# Patient Record
Sex: FEMALE | Race: WHITE | NOT HISPANIC OR LATINO | Employment: FULL TIME | URBAN - NONMETROPOLITAN AREA
[De-identification: names, ages, dates, MRNs, and addresses within clinical notes are randomized per-mention and may not be internally consistent; named-entity substitution may affect disease eponyms.]

---

## 2017-03-19 ENCOUNTER — COMMUNICATION - GICH (OUTPATIENT)
Dept: FAMILY MEDICINE | Facility: OTHER | Age: 27
End: 2017-03-19

## 2017-03-19 DIAGNOSIS — F41.9 ANXIETY DISORDER: ICD-10-CM

## 2017-03-19 DIAGNOSIS — F32.9 MAJOR DEPRESSIVE DISORDER, SINGLE EPISODE: ICD-10-CM

## 2017-06-23 ENCOUNTER — COMMUNICATION - GICH (OUTPATIENT)
Dept: FAMILY MEDICINE | Facility: OTHER | Age: 27
End: 2017-06-23

## 2017-06-23 DIAGNOSIS — F32.9 MAJOR DEPRESSIVE DISORDER, SINGLE EPISODE: ICD-10-CM

## 2017-06-23 DIAGNOSIS — F41.9 ANXIETY DISORDER: ICD-10-CM

## 2017-09-27 ENCOUNTER — OFFICE VISIT - GICH (OUTPATIENT)
Dept: FAMILY MEDICINE | Facility: OTHER | Age: 27
End: 2017-09-27

## 2017-09-27 ENCOUNTER — HISTORY (OUTPATIENT)
Dept: FAMILY MEDICINE | Facility: OTHER | Age: 27
End: 2017-09-27

## 2017-09-27 DIAGNOSIS — F51.02 ADJUSTMENT INSOMNIA: ICD-10-CM

## 2017-09-27 DIAGNOSIS — F32.9 MAJOR DEPRESSIVE DISORDER, SINGLE EPISODE: ICD-10-CM

## 2017-09-27 DIAGNOSIS — F41.9 ANXIETY DISORDER: ICD-10-CM

## 2017-09-27 ASSESSMENT — ANXIETY QUESTIONNAIRES
6. BECOMING EASILY ANNOYED OR IRRITABLE: NEARLY EVERY DAY
3. WORRYING TOO MUCH ABOUT DIFFERENT THINGS: MORE THAN HALF THE DAYS
2. NOT BEING ABLE TO STOP OR CONTROL WORRYING: NEARLY EVERY DAY
GAD7 TOTAL SCORE: 15
5. BEING SO RESTLESS THAT IT IS HARD TO SIT STILL: SEVERAL DAYS
1. FEELING NERVOUS, ANXIOUS, OR ON EDGE: NEARLY EVERY DAY
4. TROUBLE RELAXING: MORE THAN HALF THE DAYS
7. FEELING AFRAID AS IF SOMETHING AWFUL MIGHT HAPPEN: SEVERAL DAYS

## 2017-09-27 ASSESSMENT — PATIENT HEALTH QUESTIONNAIRE - PHQ9: SUM OF ALL RESPONSES TO PHQ QUESTIONS 1-9: 11

## 2017-11-24 ENCOUNTER — COMMUNICATION - GICH (OUTPATIENT)
Dept: FAMILY MEDICINE | Facility: OTHER | Age: 27
End: 2017-11-24

## 2017-11-24 DIAGNOSIS — F41.8 OTHER SPECIFIED ANXIETY DISORDERS: ICD-10-CM

## 2017-12-12 ENCOUNTER — HISTORY (OUTPATIENT)
Dept: FAMILY MEDICINE | Facility: OTHER | Age: 27
End: 2017-12-12

## 2017-12-12 ENCOUNTER — OFFICE VISIT - GICH (OUTPATIENT)
Dept: FAMILY MEDICINE | Facility: OTHER | Age: 27
End: 2017-12-12

## 2017-12-12 DIAGNOSIS — F41.8 OTHER SPECIFIED ANXIETY DISORDERS: ICD-10-CM

## 2017-12-12 DIAGNOSIS — F51.01 PRIMARY INSOMNIA: ICD-10-CM

## 2017-12-12 DIAGNOSIS — Z56.6 OTHER PHYSICAL AND MENTAL STRAIN RELATED TO WORK: ICD-10-CM

## 2017-12-12 SDOH — HEALTH STABILITY - MENTAL HEALTH: OTHER PHYSICAL AND MENTAL STRAIN RELATED TO WORK: Z56.6

## 2017-12-12 ASSESSMENT — ANXIETY QUESTIONNAIRES
6. BECOMING EASILY ANNOYED OR IRRITABLE: MORE THAN HALF THE DAYS
5. BEING SO RESTLESS THAT IT IS HARD TO SIT STILL: NEARLY EVERY DAY
1. FEELING NERVOUS, ANXIOUS, OR ON EDGE: NEARLY EVERY DAY
2. NOT BEING ABLE TO STOP OR CONTROL WORRYING: MORE THAN HALF THE DAYS
GAD7 TOTAL SCORE: 17
4. TROUBLE RELAXING: MORE THAN HALF THE DAYS
7. FEELING AFRAID AS IF SOMETHING AWFUL MIGHT HAPPEN: MORE THAN HALF THE DAYS
3. WORRYING TOO MUCH ABOUT DIFFERENT THINGS: NEARLY EVERY DAY

## 2017-12-27 NOTE — PROGRESS NOTES
Patient Information     Patient Name MRN Tray Lozoya [id number] Female 1990      Progress Notes by Misbah Hunt MD at 2017  1:30 PM     Author:  Misbah Hunt MD Service:  (none) Author Type:  Physician     Filed:  2017  1:56 PM Encounter Date:  2017 Status:  Signed     :  Misbah Hunt MD (Physician)            SUBJECTIVE:  27 y.o. female who presents for follow-up of her anxiety. She states that she's had a lot of trouble over the last several months with increasing anxiety and difficulty sleeping. She denies being depressed. 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 She works to 9:58 PM shift. Many of the residents have sundowning problems. She's noted that she gets home, she is very stressed out, and has a hard time falling asleep. She doesn't believe that her medications that she is taking now which include Celexa and BuSpar, are helping. She's been on them for quite some time. She wanted to come in sooner but she lacked insurance coverage, which she has now.    She feels well otherwise. She's having no physical problems at all. She again she denies being depressed. She denies any other problems with lifestyle.    Additional Review of Systems: see HPI:   No new symptoms otherwise    Past Medical History:     Diagnosis  Date     History of depression      History of recurrent UTIs         Current Outpatient Prescriptions       Medication  Sig Dispense Refill     ALPRAZolam (XANAX) 0.5 mg tablet Take 1 tablet by mouth 3 times daily if needed for Anxiety. 90 tablet 1     citalopram (CELEXA) 40 mg tablet TAKE ONE TABLET BY MOUTH ONE TIME DAILY 30 tablet 5     fluticasone (50 mcg per actuation) nasal solution (FLONASE) USE ONE SPRAY IN EACH NOSTRIL TWICE DAILY  16 g 11     omeprazole (PRILOSEC) 40 mg Delayed-Release  "capsule Take 1 capsule by mouth once daily. 90 capsule 3     No current facility-administered medications for this visit.      Medications have been reviewed by me and are current to the best of my knowledge and ability.      Allergies as of 09/27/2017 - Liban as Reviewed 09/27/2017      Allergen  Reaction Noted     Amoxicillin Rash 09/24/2012        OBJECTIVE:  /68  Pulse 88  Ht 1.689 m (5' 6.5\")  Wt 58.7 kg (129 lb 6.4 oz)  BMI 20.57 kg/m2  EXAM:  General Appearance: Pleasant, alert, appropriate appearance for age. No acute distress  Chest/Respiratory Exam: Normal chest wall and respirations. Clear to auscultation.  Cardiovascular Exam: Regular rate and rhythm. S1, S2, no murmur, click, gallop, or rubs.  Psychiatric Exam: Alert and oriented, appropriate affect.  Does not appear depressed.    ASSESSMENT/PLAN:  Anxiety associated with poor sleep habits. A lot of this likely is due to her work environment. She does like her job but it is stressful. I suggested at this point she stay on the Celexa, but we'll discontinue the BuSpar and changed to alprazolam 0.5 mg 3 times a day, which she's taken in the past several years ago and noted it was beneficial. We discussed the fact that this should be when needed only, and hopefully short-term. She was given a month's supply with one refill, and we'll follow-up again in 4-6 weeks to reevaluate.  Misbah Hunt MD ....................  9/27/2017   1:55 PM            "

## 2017-12-28 NOTE — TELEPHONE ENCOUNTER
Patient Information     Patient Name MRN Tray Lozoya [id number] Female 1990      Telephone Encounter by Nancy Melendrez at 2017 11:38 AM     Author:  Nancy Melendrez Service:  (none) Author Type:  NURS- Registered Nurse     Filed:  2017 11:45 AM Encounter Date:  2017 Status:  Signed     :  Nancy Melendrez (NURS- Registered Nurse)            Depression-in adults 18 and over  SSRI    Office visit in the past 12 months or as indicated in chart.  Should have clinic visit 1-2 months after initial prescription.    Last visit with RICHMOND ANDUJAR was on: 2016 [facility]  Next visit with RICHMOND ANDUJAR is on: No future appointment listed with this provider  Next visit with Family Practice is on: No future appointment listed in this department    Max refills 12 months from last office visit or per providers notes.      PHQ Depression Screening 2016   Date of PHQ exam (doc flow) 2016   1. Lack of interest/pleasure 1 - Several days   2. Feeling down/depressed 1 - Several days   PHQ-2 TOTAL SCORE 2   3. Trouble sleeping 3 - Nearly every day   4. Decreased energy 3 - Nearly every day   5. Appetite change 2 - More than half the days   6. Feelings of failure 1 - Several days   7. Trouble concentrating 1 - Several days   8. Activity level 0 - Not at all   9. Hurting yourself 0 - Not at all   PHQ-9 TOTAL SCORE 12   PHQ-9 Severity Level moderate   Functional Impairment somewhat difficult         [facility] [place]  Name of Medication: Buspar  Quantity requested: 90  Last fill date: 3/21/17  Due for refill: yes  Last visit with RICHMOND ANDUJAR was on: 2016 in [facility]  PCP:  Richmond Andujar MD  Controlled Substance Agreement:  n/a   Diagnosis r/t this medication request: anxiety and depression       Patient called and reminded that she was given a limited refill in march and letter was sent stating  that she is due for annual med review. Patient stated that she does not have insurance.  Please advise for medication refills.    Unable to complete prescription refill per RN Medication Refill Policy.................... Nancy Melendrez RN ....................  6/23/2017   11:39 AM

## 2017-12-28 NOTE — TELEPHONE ENCOUNTER
Patient Information     Patient Name MRN Tray Lozoya [id number] Female 1990      Telephone Encounter by Adali Stiles RN at 2017  3:52 PM     Author:  Adali Stiles RN Service:  (none) Author Type:  NURS- Registered Nurse     Filed:  2017  4:00 PM Encounter Date:  2017 Status:  Signed     :  Adali Stiles RN (NURS- Registered Nurse)            Quentin RasmusseneVICTOR MANUEL verified with pharmacy she filled on 2017 for 30 days and again on 10/25/2017 for another 30 day supply.  Last order was for #90 with one refill take 3 tabs daily.  Adali Stiles RN ....................  2017   3:58 PM

## 2017-12-28 NOTE — TELEPHONE ENCOUNTER
Patient Information     Patient Name MRN Tray Lozoya [id number] Female 1990      Telephone Encounter by Liliana Treadwell NP at 2017  3:34 PM     Author:  Liliana Treadwell NP Service:  (none) Author Type:  PHYS- Nurse Practitioner     Filed:  2017  3:35 PM Encounter Date:  2017 Status:  Signed     :  Liliana Treadwell NP (PHYS- Nurse Practitioner)            Hi Adali    This looks like it was filled in September #90 with 3 refills  Could you check please--thanks  Liliana Treadwell NP ....................  2017   3:34 PM

## 2017-12-28 NOTE — TELEPHONE ENCOUNTER
Patient Information     Patient Name MRN Sex Tray Wilcox [id number] Female 1990      Telephone Encounter by Adali Stiles RN at 2017  3:03 PM     Author:  Adali Stiles RN  Service:  (none) Author Type:  NURS- Registered Nurse     Filed:  2017  3:31 PM  Encounter Date:  2017 Status:  Addendum     :  Adali Stiles RN (NURS- Registered Nurse)        Related Notes: Original Note by Adali Stiles RN (NURS- Registered Nurse) filed at 2017  3:18 PM            In clinical absence of patient's primary, Misbah Andujar MD, patient is requesting that this message be sent to the primary provider's Teamlet for consideration please.      Liliana  Pt is requesting a refill of Alprazolam, last filled 2017 #90 R1 taking three daily as needed..  Pt was last seen 2017 when medication was prescribed.  Pt was to follow up in 4-6 weeks.  Pt was called and transferred to scheduling, future appointment noted for 2017.      [facility]  Name of Medication: Xanax  Quantity requested: 90  Last fill date: 2017  Due for refill: yes  Last visit with MISBAH ANDUJAR was on: 2017 in [facility]  PCP:  Misbah Andujar MD  Controlled Substance Agreement:  None signed  Diagnosis r/t this medication request: Anxiety     Unable to complete prescription refill per RN Medication Refill Policy.................... Adali Stiles RN ....................  2017   3:25 PM

## 2017-12-28 NOTE — TELEPHONE ENCOUNTER
Patient Information     Patient Name MRN Tray Lozoya [id number] Female 1990      Telephone Encounter by Adali Stiles RN at 2017  4:45 PM     Author:  Adali Stiles RN Service:  (none) Author Type:  NURS- Registered Nurse     Filed:  2017  4:46 PM Encounter Date:  2017 Status:  Signed     :  Adali Stiles RN (NURS- Registered Nurse)            Prescription was faxed to [facility].  Adali Stiles RN ....................  2017   4:45 PM

## 2017-12-30 NOTE — NURSING NOTE
Patient Information     Patient Name MRN Sex Tray Wilcox [id number] Female 1990      Nursing Note by Kayla Potts at 2017  1:30 PM     Author:  Kayla Potts Service:  (none) Author Type:  (none)     Filed:  2017  1:37 PM Encounter Date:  2017 Status:  Signed     :  Kayla Potts            Pt presents for medication management.  Kayla Gonzalez LPN

## 2018-01-03 NOTE — TELEPHONE ENCOUNTER
Patient Information     Patient Name MRN Tray Lozoya [id number] Female 1990      Telephone Encounter by Shavonne Scruggs RN at 3/20/2017 11:11 AM     Author:  Shavonne Scruggs RN Service:  (none) Author Type:  (none)     Filed:  3/20/2017 11:15 AM Encounter Date:  3/19/2017 Status:  Signed     :  Shavonne Scruggs RN (NURS- Registered Nurse)            Depression-in adults 18 and over  SSRI    Office visit in the past 12 months or as indicated in chart.  Should have clinic visit 1-2 months after initial prescription.    Last visit with [facility]  Next visit with RICHMOND ANDUJAR is on: No future appointment listed with this provider  Next visit with Family Practice is on: No future appointment listed in this department    Max refills 12 months from last office visit or per providers notes.      PHQ Depression Screening 2016   Date of PHQ exam (doc flow) 2016   1. Lack of interest/pleasure 1 - Several days   2. Feeling down/depressed 1 - Several days   PHQ-2 TOTAL SCORE 2   3. Trouble sleeping 3 - Nearly every day   4. Decreased energy 3 - Nearly every day   5. Appetite change 2 - More than half the days   6. Feelings of failure 1 - Several days   7. Trouble concentrating 1 - Several days   8. Activity level 0 - Not at all   9. Hurting yourself 0 - Not at all   PHQ-9 TOTAL SCORE 12   PHQ-9 Severity Level moderate   Functional Impairment somewhat difficult       Patient is due for medication management appointment. Limited refill provided at this time and letter sent for reminder to patient. Prescription refilled per RN Medication Refill Policy.................... Shavonne Scruggs ....................  3/20/2017   11:12 AM

## 2018-01-03 NOTE — TELEPHONE ENCOUNTER
Patient Information     Patient Name MRN Sex Tray Wilcox [id number] Female 1990      Telephone Encounter by Shavonne Scruggs RN at 3/20/2017 11:12 AM     Author:  Shavonne Scruggs RN Service:  (none) Author Type:  (none)     Filed:  3/20/2017 11:15 AM Encounter Date:  3/19/2017 Status:  Signed     :  Shavonne Scruggs RN (NURS- Registered Nurse)              [facility] [place]  Name of Medication: Buspar  Quantity requested: #90  Last fill date: 2016  Due for refill: Yes  [facility]  PCP:  Richmond Andujar MD  Controlled Substance Agreement:  NONE   Diagnosis r/t this medication request: Anxiety    No flowsheet data found.      Patient is due for medication management appointment. Letter sent for reminder to patient.     Unable to complete prescription refill per RN Medication Refill Policy.................... Shavonne Scruggs ....................  3/20/2017   11:12 AM

## 2018-01-27 VITALS
HEIGHT: 67 IN | BODY MASS INDEX: 20.31 KG/M2 | HEART RATE: 88 BPM | DIASTOLIC BLOOD PRESSURE: 68 MMHG | SYSTOLIC BLOOD PRESSURE: 126 MMHG | WEIGHT: 129.4 LBS

## 2018-02-04 ASSESSMENT — PATIENT HEALTH QUESTIONNAIRE - PHQ9: SUM OF ALL RESPONSES TO PHQ QUESTIONS 1-9: 11

## 2018-02-04 ASSESSMENT — ANXIETY QUESTIONNAIRES: GAD7 TOTAL SCORE: 15

## 2018-02-09 VITALS
WEIGHT: 152.8 LBS | BODY MASS INDEX: 24.29 KG/M2 | DIASTOLIC BLOOD PRESSURE: 80 MMHG | HEART RATE: 72 BPM | SYSTOLIC BLOOD PRESSURE: 128 MMHG

## 2018-02-10 ASSESSMENT — ANXIETY QUESTIONNAIRES: GAD7 TOTAL SCORE: 17

## 2018-02-12 NOTE — NURSING NOTE
Patient Information     Patient Name MRN Tray Lozoya [id number] Female 1990      Nursing Note by Ani Schrader at 2017  2:15 PM     Author:  Ani Schrader  Service:  (none) Author Type:  (none)     Filed:  2017  2:25 PM  Encounter Date:  2017 Status:  Addendum     :  Ani Schrader        Related Notes: Original Note by Ani Schrader filed at 2017  2:20 PM            Patient presents to clinic for medication management. Patient would like to discuss different options other than Xanax.   Ani Wright ....................  2017   2:19 PM

## 2018-02-12 NOTE — PROGRESS NOTES
Patient Information     Patient Name MRN Tray Lozoya [id number] Female 1990      Progress Notes by Misbah Hunt MD at 2017  2:15 PM     Author:  Misbah Hunt MD Service:  (none) Author Type:  Physician     Filed:  2017  3:20 PM Encounter Date:  2017 Status:  Signed     :  Misbah Hunt MD (Physician)            SUBJECTIVE:  27 y.o. female who presents for medication evaluation. She has found that she didn't realize how much BuSpar was helping her until she went off of it. The increase Celexa has been fine. She's been taking alprazolam mainly at bedtime, but finds that she oftentimes has to repeat the dose. 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 She said that the stress and anxiety increases and she doesn't like to take alprazolam during the day because of the side effects. At this point she is interested in getting back on the BuSpar but requests having alprazolam to use just at night. She has tried trazodone in the past but felt too groggy the next morning.    Additional Review of Systems: see HPI:   No new symptoms otherwise    Past Medical History:     Diagnosis  Date     History of depression      History of recurrent UTIs         Current Outpatient Prescriptions       Medication  Sig Dispense Refill     ALPRAZolam (XANAX) 1 mg tablet Take 0.5-1 tablets by mouth at bedtime if needed. 30 tablet 5     busPIRone (BUSPAR) 10 mg tablet Take 1 tablet by mouth 3 times daily if needed for Other (Specify) (anxiety). 90 tablet 5     citalopram (CELEXA) 40 mg tablet Take 1 tablet by mouth once daily. 30 tablet 5     fluticasone (50 mcg per actuation) nasal solution (FLONASE) USE ONE SPRAY IN EACH NOSTRIL TWICE DAILY  16 g 11     omeprazole (PRILOSEC) 40 mg Delayed-Release capsule Take 1 capsule by mouth once daily. 90 capsule 3     No current facility-administered medications for  this visit.      Medications have been reviewed by me and are current to the best of my knowledge and ability.      Allergies as of 12/12/2017 - Liban as Reviewed 12/12/2017      Allergen  Reaction Noted     Amoxicillin Rash 09/24/2012        OBJECTIVE:  /80 (Cuff Site: Right Arm, Cuff Size: Adult Regular)  Pulse 72  Wt 69.3 kg (152 lb 12.8 oz)  LMP 11/28/2017  BMI 24.29 kg/m2  EXAM:  General Appearance: Pleasant, alert, appropriate appearance for age. No acute distress  Psychiatric Exam: Alert and oriented, appropriate affect.  Discussed her problems for a frankly. She is not suicidal or severely depressed. The main problem is the stress and anxiety.      ASSESSMENT/PLAN:  Stress and anxiety associated with sleep disturbance. I think a reasonable approach is to use the BuSpar 10 mg 3 times a day when necessary during the day. Continue Celexa at 40 mg. Use alprazolam only at bedtime, a half to 1 mg. she is intolerant of trazodone. She will try this over the next few months in follow-up in 6 months or sooner if not improving. In addition I recommended an exam and Pap test sometime within the next few months.  Misbah Hunt MD ....................  12/12/2017   3:16 PM

## 2018-02-14 ENCOUNTER — DOCUMENTATION ONLY (OUTPATIENT)
Dept: FAMILY MEDICINE | Facility: OTHER | Age: 28
End: 2018-02-14

## 2018-02-14 PROBLEM — F51.01 PRIMARY INSOMNIA: Status: ACTIVE | Noted: 2017-12-12

## 2018-02-14 PROBLEM — Z56.6 STRESS AT WORK: Status: ACTIVE | Noted: 2017-12-12

## 2018-02-14 RX ORDER — CITALOPRAM HYDROBROMIDE 40 MG/1
40 TABLET ORAL DAILY
COMMUNITY
Start: 2017-12-12 | End: 2018-07-17

## 2018-02-14 RX ORDER — OMEPRAZOLE 40 MG/1
40 CAPSULE, DELAYED RELEASE ORAL DAILY
COMMUNITY
Start: 2016-02-25 | End: 2018-07-09

## 2018-02-14 RX ORDER — FLUTICASONE PROPIONATE 50 MCG
1 SPRAY, SUSPENSION (ML) NASAL 2 TIMES DAILY
COMMUNITY
Start: 2016-02-19 | End: 2020-05-12

## 2018-02-14 RX ORDER — ALPRAZOLAM 1 MG
.5-1 TABLET ORAL
COMMUNITY
Start: 2017-12-12 | End: 2018-05-23

## 2018-02-14 RX ORDER — BUSPIRONE HYDROCHLORIDE 10 MG/1
10 TABLET ORAL 3 TIMES DAILY PRN
COMMUNITY
Start: 2017-12-12 | End: 2018-05-23

## 2018-03-13 DIAGNOSIS — F41.9 ANXIETY AND DEPRESSION: Primary | ICD-10-CM

## 2018-03-13 DIAGNOSIS — F32.A ANXIETY AND DEPRESSION: Primary | ICD-10-CM

## 2018-03-13 RX ORDER — CITALOPRAM HYDROBROMIDE 40 MG/1
40 TABLET ORAL EVERY MORNING
Qty: 30 TABLET | Refills: 2 | OUTPATIENT
Start: 2018-03-13

## 2018-03-13 RX ORDER — ALPRAZOLAM 1 MG
1 TABLET ORAL
Qty: 30 TABLET | Refills: 2 | OUTPATIENT
Start: 2018-03-13

## 2018-03-13 RX ORDER — BUSPIRONE HYDROCHLORIDE 10 MG/1
10 TABLET ORAL 3 TIMES DAILY PRN
Qty: 90 TABLET | Refills: 2 | OUTPATIENT
Start: 2018-03-13

## 2018-03-13 NOTE — TELEPHONE ENCOUNTER
Medications filled 12/12/2017 for 6 months.    Unable to complete prescription refill per RN Medication Refill Policy.................... Adali Stiles ....................  3/13/2018   4:38 PM

## 2018-03-13 NOTE — TELEPHONE ENCOUNTER
Should have an appointment or PHQ9  questionaire done (last PHQ9 9/27/17) (11) prior to running out of refills.  Yvan 7 was (17) on 12/12/17.  Norma J. Gosselin LPN....................  3/13/2018   12:04 PM

## 2018-03-26 ENCOUNTER — HEALTH MAINTENANCE LETTER (OUTPATIENT)
Age: 28
End: 2018-03-26

## 2018-05-23 DIAGNOSIS — F43.22 ADJUSTMENT DISORDER WITH ANXIOUS MOOD: Primary | ICD-10-CM

## 2018-05-23 RX ORDER — BUSPIRONE HYDROCHLORIDE 10 MG/1
10 TABLET ORAL 3 TIMES DAILY PRN
Qty: 90 TABLET | Status: CANCELLED | OUTPATIENT
Start: 2018-05-23

## 2018-05-23 RX ORDER — BUSPIRONE HYDROCHLORIDE 10 MG/1
TABLET ORAL
Qty: 90 TABLET | Refills: 5 | OUTPATIENT
Start: 2018-05-23

## 2018-05-23 RX ORDER — BUSPIRONE HYDROCHLORIDE 10 MG/1
10 TABLET ORAL 3 TIMES DAILY PRN
Qty: 90 TABLET | Refills: 11 | Status: SHIPPED | OUTPATIENT
Start: 2018-05-23 | End: 2019-04-23

## 2018-05-23 RX ORDER — ALPRAZOLAM 0.5 MG
.5-1 TABLET ORAL
Qty: 60 TABLET | Refills: 5 | Status: SHIPPED | OUTPATIENT
Start: 2018-05-23 | End: 2018-07-09

## 2018-05-23 NOTE — TELEPHONE ENCOUNTER
[facility] states the patient got:  #90 on 12/12/17  #90 on 1/7/18  #90 on 2/3/18  #90 on 3/4/18  #90 on 3/31/18  #90 on 4/24/18    That is 6 refills: There are no refills left.  Please send new Rx for refill.  Norma J. Gosselin LPN....................  5/23/2018   3:05 PM

## 2018-05-23 NOTE — TELEPHONE ENCOUNTER
Redundant refill request refused: Too soon.    busPIRone (BUSPAR) 10 mg tablet 90 tablet 5 12/12/2017     Sig - Route: Take 1 tablet by mouth 3 times daily if needed for Other (Specify) (anxiety). - Oral    Class: eRx    E-Prescribing Status: Receipt confirmed by pharmacy (12/12/2017  3:11 PM CST)      [facility] [address] - [place], [place] - [address]     Unable to complete prescription refill per RN Medication Refill Policy. Ruth Clark RN .............. 5/23/2018  2:34 PM

## 2018-05-23 NOTE — TELEPHONE ENCOUNTER
Patient calling in regards to needing a refill of her buspirone and alprazolam. She states she isn't able to get an appointment any time soon, but will run out before. I stated I would ask Dr. Hunt and see what he says. She was also transferred to appointment line to schedule.     Caroline Garland LPN...................5/23/2018  3:33 PM

## 2018-07-09 ENCOUNTER — OFFICE VISIT (OUTPATIENT)
Dept: FAMILY MEDICINE | Facility: OTHER | Age: 28
End: 2018-07-09
Attending: FAMILY MEDICINE
Payer: COMMERCIAL

## 2018-07-09 VITALS
HEIGHT: 66 IN | DIASTOLIC BLOOD PRESSURE: 70 MMHG | WEIGHT: 165.2 LBS | SYSTOLIC BLOOD PRESSURE: 122 MMHG | BODY MASS INDEX: 26.55 KG/M2 | HEART RATE: 72 BPM

## 2018-07-09 DIAGNOSIS — F43.22 ADJUSTMENT DISORDER WITH ANXIOUS MOOD: ICD-10-CM

## 2018-07-09 DIAGNOSIS — F51.01 PRIMARY INSOMNIA: Primary | ICD-10-CM

## 2018-07-09 DIAGNOSIS — Z56.6 STRESS AT WORK: ICD-10-CM

## 2018-07-09 PROCEDURE — 99213 OFFICE O/P EST LOW 20 MIN: CPT | Performed by: FAMILY MEDICINE

## 2018-07-09 PROCEDURE — G0463 HOSPITAL OUTPT CLINIC VISIT: HCPCS

## 2018-07-09 RX ORDER — ALPRAZOLAM 1 MG
1 TABLET ORAL
Qty: 45 TABLET | Refills: 5 | Status: SHIPPED | OUTPATIENT
Start: 2018-07-09 | End: 2018-12-21

## 2018-07-09 SDOH — HEALTH STABILITY - MENTAL HEALTH: OTHER PHYSICAL AND MENTAL STRAIN RELATED TO WORK: Z56.6

## 2018-07-09 NOTE — MR AVS SNAPSHOT
"              After Visit Summary   7/9/2018    Tray Talavera    MRN: [id number]           Patient Information     Date Of Birth          1990        Visit Information        Provider Department      7/9/2018 1:30 PM Misbah Hunt MD [facility]        Today's Diagnoses     Primary insomnia    -  1    Adjustment disorder with anxious mood        Stress at work           Follow-ups after your visit        Who to contact     If you have questions or need follow up information about today's clinic visit or your schedule please contact [facility] directly at [phone].  Normal or non-critical lab and imaging results will be communicated to you by [employer]hart, letter or phone within 4 business days after the clinic has received the results. If you do not hear from us within 7 days, please contact the clinic through [employer] or phone. If you have a critical or abnormal lab result, we will notify you by phone as soon as possible.  Submit refill requests through [employer] or call your pharmacy and they will forward the refill request to us. Please allow 3 business days for your refill to be completed.          Additional Information About Your Visit        MyChart Information     [employer] gives you secure access to your electronic health record. If you see a primary care provider, you can also send messages to your care team and make appointments. If you have questions, please call your primary care clinic.  If you do not have a primary care provider, please call [phone] and they will assist you.        Care EveryWhere ID     This is your Care EveryWhere ID. This could be used by other organizations to access your [facility] medical records  [id number]        Your Vitals Were     Pulse Height Last Period Breastfeeding? BMI (Body Mass Index)       72 5' 5.5\" (1.664 m) 06/27/2018 No 27.07 kg/m2        Blood Pressure from Last 3 Encounters:   07/09/18 122/70   12/12/17 " 128/80   09/27/17 126/68    Weight from Last 3 Encounters:   07/09/18 165 lb 3.2 oz (74.9 kg)   12/12/17 152 lb 12.8 oz (69.3 kg)   09/27/17 129 lb 6.4 oz (58.7 kg)              Today, you had the following     No orders found for display         Today's Medication Changes          These changes are accurate as of 7/9/18  2:27 PM.  If you have any questions, ask your nurse or doctor.               These medicines have changed or have updated prescriptions.        Dose/Directions    ALPRAZolam 1 MG tablet   Commonly known as:  XANAX   This may have changed:    - medication strength  - how much to take  - additional instructions   Used for:  Adjustment disorder with anxious mood   Changed by:  Misbah Hunt MD        Dose:  1 mg   Take 1 tablet (1 mg) by mouth nightly as needed Repeat 0.5 mg at night if needed   Quantity:  45 tablet   Refills:  5            Where to get your medicines      Some of these will need a paper prescription and others can be bought over the counter.  Ask your nurse if you have questions.     Bring a paper prescription for each of these medications     ALPRAZolam 1 MG tablet                Primary Care Provider Office Phone # Fax #    Misbah Hunt [phone] [address] [facility] [address]        Equal Access to Services     ROSSANA OTERO AH: Hadii reilly swano [place], waaxda luqadaha, qaybta kaalmada adeegyada, caitlin nolasco. So [facility] [phone].    ATENCIÓN: Si habla español, tiene a caban disposición servicios gratuitos de asistencia lingüística. Llame al [phone].    We comply with applicable federal civil rights laws and [place] laws. We do not discriminate on the basis of race, color, national origin, age, disability, sex, sexual orientation, or gender identity.            Thank you!     Thank you for choosing [facility] AND [facility]  for your care. Our goal is always to provide you with  excellent care. Hearing back from our patients is one way we can continue to improve our services. Please take a few minutes to complete the written survey that you may receive in the mail after your visit with us. Thank you!             Your Updated Medication List - Protect others around you: Learn how to safely use, store and throw away your medicines at www.disposemymeds.org.          This list is accurate as of 7/9/18  2:27 PM.  Always use your most recent med list.                   Brand Name Dispense Instructions for use Diagnosis    ALPRAZolam 1 MG tablet    XANAX    45 tablet    Take 1 tablet (1 mg) by mouth nightly as needed Repeat 0.5 mg at night if needed    Adjustment disorder with anxious mood       busPIRone 10 MG tablet    BUSPAR    90 tablet    Take 1 tablet (10 mg) by mouth 3 times daily as needed    Adjustment disorder with anxious mood       citalopram 40 MG tablet    celeXA     Take 40 mg by mouth daily        fluticasone 50 MCG/ACT spray    FLONASE     Spray 1 spray in nostril 2 times daily

## 2018-07-09 NOTE — PROGRESS NOTES
"SUBJECTIVE:  28 year old female who presents for medication refill.  She has been feeling reasonably well, although she has been under more stress recently, working almost every day at a relatively stressful job.  She then had to euthanize her 12-year-old dog, due to cancer.  This is been very difficult that she had the dog since it was a puppy.  She said she is dealing with it okay but gets depressed at times.    Celexa and BuSpar have been stable and doing well for her.  She takes alprazolam at night to ease the stress and help to sleep but she is found is been more difficult.  She had this 0.5 mg tablets and was taking 2 of them.  She does not have any morning hangover but she usually wakes up after about 4 hours.    She is otherwise feeling fine.  No new problems or concerns.    Additional Review of Systems: See HPI: No new symptoms otherwise    Past Medical History:   Diagnosis Date     Personal history of other mental and behavioral disorders     No Comments Provided     Personal history of urinary infection     No Comments Provided        Current Outpatient Prescriptions   Medication Sig Dispense Refill     ALPRAZolam (XANAX) 1 MG tablet Take 1 tablet (1 mg) by mouth nightly as needed Repeat 0.5 mg at night if needed 45 tablet 5     busPIRone (BUSPAR) 10 MG tablet Take 1 tablet (10 mg) by mouth 3 times daily as needed 90 tablet 11     citalopram (CELEXA) 40 MG tablet Take 40 mg by mouth daily       fluticasone (FLONASE) 50 MCG/ACT spray Spray 1 spray in nostril 2 times daily         Allergies as of 07/09/2018 - Liban as Reviewed 07/09/2018   Allergen Reaction Noted     Trazodone  12/12/2017     Amoxicillin Rash 09/24/2012        OBJECTIVE:  /70  Pulse 72  Ht 5' 5.5\" (1.664 m)  Wt 165 lb 3.2 oz (74.9 kg)  LMP 06/27/2018  Breastfeeding? No  BMI 27.07 kg/m2  EXAM: {EXAM -  General: She is a pleasant, healthy-appearing young woman, who appears quite fatigued, but is in no acute distress  Neuro/psych: " She is alert and oriented, normal mood and affect, answers questions appropriately, mildly depressed, but not suicidal.    Labs/imaging: Thyroid functions and hemoglobin were normal 2 years ago.  Pap test was done in 2014    ASSESSMENT/PLAN:  Recurrent anxiety with associated depression and insomnia.  She states she is actually doing quite well with her current meds but would like to sleep better.  I suggested perhaps increasing the alprazolam 201 mg tablet, with 1/2 mg repeat if needed.  She is going to try this for a month or so, and let us know if things do not improve.    I did recommend that she schedule at some point for an exam and Pap test.  She stated she intends to do this sometime over the course of the next few months.  RICHMOND ANDUJAR MD on 7/9/2018 at 2:27 PM

## 2018-07-09 NOTE — NURSING NOTE
Patient presents to clinic for medication management.  Ani Wright ....................  7/9/2018   1:26 PM

## 2018-07-17 DIAGNOSIS — F32.A ANXIETY AND DEPRESSION: Primary | ICD-10-CM

## 2018-07-17 DIAGNOSIS — F41.9 ANXIETY AND DEPRESSION: Primary | ICD-10-CM

## 2018-07-19 RX ORDER — CITALOPRAM HYDROBROMIDE 40 MG/1
TABLET ORAL
Qty: 30 TABLET | Refills: 5 | Status: SHIPPED | OUTPATIENT
Start: 2018-07-19 | End: 2019-01-10

## 2018-07-19 NOTE — TELEPHONE ENCOUNTER
"Refill request from [facility] for:  citalopram (CELEXA) 40 MG tablet    LOV 7/9/2018 with Misbah Talaveraa and BuSpar have been stable and doing well for her    Refilled 12/12/2017 for 30 tablets and 5 refills     Medication refilled    Requested Prescriptions   Pending Prescriptions Disp Refills     citalopram (CELEXA) 40 MG tablet [Pharmacy Med Name: CITALOPRAM HBR 40 MG TABLET] 30 tablet 5     Sig: TAKE 1 TABLET BY MOUTH ONCE DAILY.    SSRIs Protocol Passed    7/19/2018  1:53 PM       Passed - Recent (12 mo) or future (30 days) visit within the authorizing provider's specialty    Patient had office visit in the last 12 months or has a visit in the next 30 days with authorizing provider or within the authorizing provider's specialty.  See \"Patient Info\" tab in inbasket, or \"Choose Columns\" in Meds & Orders section of the refill encounter.           Passed - Patient is age 18 or older       Passed - No active pregnancy on record       Passed - No positive pregnancy test in last 12 months        Lilly Crenshaw RN  ....................  7/19/2018   3:03 PM      "

## 2018-07-24 NOTE — PROGRESS NOTES
Patient Information     Patient Name  Tray Talavera MRN  [id number] Sex  Female   1990      Letter by Misbah Hunt MD at      Author:  Misbah Hunt MD Service:  (none) Author Type:  (none)    Filed:   Encounter Date:  3/19/2017 Status:  (Other)           Tray Talavera  [address] [place] [id number]          2017    Dear Ms. Talavera:    This letter is to remind you that you are due for your annual exam with Misbah Hunt MD . Your last comprehensive medication visit was more than 12 months ago.     A LIMITED refill of citalopram (CELEXA) 40 mg tablet has been called into your pharmacy.    Additional refills require an annual physical and medication refill appointment with Misbah Hunt MD. Please call the clinic at [phone] to schedule your appointment.    Thank you,    The Refill Nurse  [facility]

## 2018-08-22 ENCOUNTER — HOSPITAL ENCOUNTER (OUTPATIENT)
Dept: GENERAL RADIOLOGY | Facility: OTHER | Age: 28
Discharge: HOME | End: 2018-08-22
Attending: NURSE PRACTITIONER | Admitting: NURSE PRACTITIONER
Payer: COMMERCIAL

## 2018-08-22 ENCOUNTER — OFFICE VISIT (OUTPATIENT)
Dept: INTERNAL MEDICINE | Facility: OTHER | Age: 28
End: 2018-08-22
Attending: NURSE PRACTITIONER
Payer: COMMERCIAL

## 2018-08-22 VITALS
SYSTOLIC BLOOD PRESSURE: 132 MMHG | TEMPERATURE: 97.4 F | DIASTOLIC BLOOD PRESSURE: 84 MMHG | WEIGHT: 163.6 LBS | BODY MASS INDEX: 26.81 KG/M2

## 2018-08-22 DIAGNOSIS — J18.9 PNEUMONIA OF RIGHT LOWER LOBE DUE TO INFECTIOUS ORGANISM: ICD-10-CM

## 2018-08-22 DIAGNOSIS — R05.9 COUGH: ICD-10-CM

## 2018-08-22 DIAGNOSIS — R05.9 COUGH: Primary | ICD-10-CM

## 2018-08-22 PROCEDURE — 99214 OFFICE O/P EST MOD 30 MIN: CPT | Performed by: NURSE PRACTITIONER

## 2018-08-22 PROCEDURE — G0463 HOSPITAL OUTPT CLINIC VISIT: HCPCS | Mod: 25

## 2018-08-22 PROCEDURE — 71046 X-RAY EXAM CHEST 2 VIEWS: CPT

## 2018-08-22 RX ORDER — ALBUTEROL SULFATE 90 UG/1
2 AEROSOL, METERED RESPIRATORY (INHALATION) EVERY 6 HOURS PRN
Qty: 1 INHALER | Refills: 0 | Status: SHIPPED | OUTPATIENT
Start: 2018-08-22 | End: 2020-05-12

## 2018-08-22 RX ORDER — AZITHROMYCIN 250 MG/1
TABLET, FILM COATED ORAL
Qty: 6 TABLET | Refills: 0 | Status: SHIPPED | OUTPATIENT
Start: 2018-08-22 | End: 2019-01-11

## 2018-08-22 ASSESSMENT — ANXIETY QUESTIONNAIRES
GAD7 TOTAL SCORE: 0
7. FEELING AFRAID AS IF SOMETHING AWFUL MIGHT HAPPEN: NOT AT ALL
6. BECOMING EASILY ANNOYED OR IRRITABLE: NOT AT ALL
1. FEELING NERVOUS, ANXIOUS, OR ON EDGE: NOT AT ALL
3. WORRYING TOO MUCH ABOUT DIFFERENT THINGS: NOT AT ALL
5. BEING SO RESTLESS THAT IT IS HARD TO SIT STILL: NOT AT ALL
IF YOU CHECKED OFF ANY PROBLEMS ON THIS QUESTIONNAIRE, HOW DIFFICULT HAVE THESE PROBLEMS MADE IT FOR YOU TO DO YOUR WORK, TAKE CARE OF THINGS AT HOME, OR GET ALONG WITH OTHER PEOPLE: NOT DIFFICULT AT ALL
2. NOT BEING ABLE TO STOP OR CONTROL WORRYING: NOT AT ALL

## 2018-08-22 ASSESSMENT — PAIN SCALES - GENERAL: PAINLEVEL: NO PAIN (0)

## 2018-08-22 ASSESSMENT — PATIENT HEALTH QUESTIONNAIRE - PHQ9: 5. POOR APPETITE OR OVEREATING: NOT AT ALL

## 2018-08-22 NOTE — PROGRESS NOTES
Subjective:  She is here today for cough that is been occurring for greater than a week.  She has cough and chest congestion.  She states that she was starting to feel better after about the first week but then her cough became more productive and she had increased nasal drainage.  She has some pressure in her sinuses.  She has not had a fever in the past week.  Her cough is productive of yellow-green sputum.  No pain in her chest with coughing.  No hemoptysis.  No shortness of breath.  No history of pneumonia.  She has been taking DayQuil for her symptoms.  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  His mother was diagnosed yesterday with pneumonia.  Patient is a smoker.  No previous history of pneumonia.    Patient Active Problem List   Diagnosis     Primary insomnia     Stress at work     Adjustment disorder with anxious mood     Past Medical History:   Diagnosis Date     Personal history of other mental and behavioral disorders     No Comments Provided     Personal history of urinary infection     No Comments Provided     Past Surgical History:   Procedure Laterality Date     APPENDECTOMY OPEN      09/05,Open, GICH     ESOPHAGOSCOPY, GASTROSCOPY, DUODENOSCOPY (EGD), COMBINED      02/23/2012,by Dr. Jama. bile reflux and gastropathy     Social History     Social History     Marital status: Single     Spouse name: N/A     Number of children: N/A     Years of education: N/A     Occupational History     Not on file.     Social History Main Topics     Smoking status: Current Every Day Smoker     Packs/day: 0.75     Types: Cigarettes     Smokeless tobacco: Never Used     Alcohol use Yes      Comment: Alcoholic Drinks/day: Rarely     Drug use: No     Sexual activity: Not Currently     Partners: Male     Other Topics Concern     Not on file     Social History Narrative    single. Works as a  an [employer]    Patient currently smokes, 1 ppd.     Alcohol Use  - yes, 1-5 drinks per week    single. currently unemployed    p 11/12/2013.     Family History   Problem Relation Age of Onset     Diabetes Mother      Diabetes     Other - See Comments Mother      Psychiatric illness,depression     Current Outpatient Prescriptions   Medication Sig Dispense Refill     albuterol (PROAIR HFA/PROVENTIL HFA/VENTOLIN HFA) 108 (90 Base) MCG/ACT inhaler Inhale 2 puffs into the lungs every 6 hours as needed for shortness of breath / dyspnea or wheezing 1 Inhaler 0     ALPRAZolam (XANAX) 1 MG tablet Take 1 tablet (1 mg) by mouth nightly as needed Repeat 0.5 mg at night if needed 45 tablet 5     azithromycin (ZITHROMAX) 250 MG tablet Two tablets first day, then one tablet daily for four days. 6 tablet 0     busPIRone (BUSPAR) 10 MG tablet Take 1 tablet (10 mg) by mouth 3 times daily as needed 90 tablet 11     citalopram (CELEXA) 40 MG tablet TAKE 1 TABLET BY MOUTH ONCE DAILY. 30 tablet 5     fluticasone (FLONASE) 50 MCG/ACT spray Spray 1 spray in nostril 2 times daily       Trazodone and Amoxicillin      Review of Systems:  Review of Systems  See HPI    Objective:   /84 (BP Location: Right arm, Patient Position: Chair, Cuff Size: Adult Regular)  Temp 97.4  F (36.3  C) (Tympanic)  Wt 163 lb 9.6 oz (74.2 kg)  LMP 07/28/2018  Breastfeeding? No  BMI 26.81 kg/m2  Physical Exam  Pleasant female no acute distress.  Affect normal.  Alert and oriented ×4.  Sclera nonicteric.  Conjunctiva noninflamed.  Skin color pink.  Nasal mucosa with mild bilateral swelling and erythema.  Mucous membranes moist.  Throat without erythema.  Neck supple without adenopathy.  Lung fields with rhonchi throughout and faint rales at right base.  No wheezing.  Chest x-ray: Peribronchial cuffing noted and small infiltrate in the right lower lung, radiology interpretation pending.    Assessment:    ICD-10-CM    1. Cough R05 XR Chest 2 Views   2. Pneumonia of right lower lobe due to infectious organism (H) J18.1  azithromycin (ZITHROMAX) 250 MG tablet     albuterol (PROAIR HFA/PROVENTIL HFA/VENTOLIN HFA) 108 (90 Base) MCG/ACT inhaler       Plan:   Patient will be treated with Z-Abdiel and albuterol inhaler 2 puffs 4 times daily as needed for increased cough and wheezing.  If radiology agrees that she does have an infiltrate  then she will need to have a follow-up chest x-ray in 1 month to make sure this has resolved.  Tobacco cessation encouraged.  If she is not finding improvement with treatment or develops any worsening then recommend follow-up.  She did not want prescribed cough syrup.  She will use over-the-counter cough syrup as needed.  Discussed interaction between Celexa and Z-Abdiel eventually causing a prolonged QT interval.  Risk is minimal due to short-term use of the Z-Abdiel.  She cannot take off work today but she will wear a mask.  She is aware that she is contagious for the 48 hours after start of antibiotic.  Encouraged good handwashing and other hygiene measures for reducing spread of infection.    DEEPAK Jorge   8/22/2018  2:11 PM

## 2018-08-22 NOTE — MR AVS SNAPSHOT
After Visit Summary   8/22/2018    Tray Talavera    MRN: [id number]           Patient Information     Date Of Birth          1990        Visit Information        Provider Department      8/22/2018 1:40 PM Yara Chavez NP [facility]        Today's Diagnoses     Cough    -  1    Pneumonia of right lower lobe due to infectious organism (H)           Follow-ups after your visit        Future tests that were ordered for you today     Open Future Orders        Priority Expected Expires Ordered    XR Chest 2 Views Routine 8/22/2018 8/22/2019 8/22/2018            Who to contact     If you have questions or need follow up information about today's clinic visit or your schedule please contact [facility] directly at [phone].  Normal or non-critical lab and imaging results will be communicated to you by Scalityhart, letter or phone within 4 business days after the clinic has received the results. If you do not hear from us within 7 days, please contact the clinic through Scalityhart or phone. If you have a critical or abnormal lab result, we will notify you by phone as soon as possible.  Submit refill requests through Veotag or call your pharmacy and they will forward the refill request to us. Please allow 3 business days for your refill to be completed.          Additional Information About Your Visit        MyChart Information     Veotag gives you secure access to your electronic health record. If you see a primary care provider, you can also send messages to your care team and make appointments. If you have questions, please call your primary care clinic.  If you do not have a primary care provider, please call [phone] and they will assist you.        Care EveryWhere ID     This is your Care EveryWhere ID. This could be used by other organizations to access your [facility] medical records  [id number]        Your Vitals Were     Temperature Last  Period Breastfeeding? BMI (Body Mass Index)          97.4  F (36.3  C) (Tympanic) 07/28/2018 No 26.81 kg/m2         Blood Pressure from Last 3 Encounters:   08/22/18 132/84   07/09/18 122/70   12/12/17 128/80    Weight from Last 3 Encounters:   08/22/18 163 lb 9.6 oz (74.2 kg)   07/09/18 165 lb 3.2 oz (74.9 kg)   12/12/17 152 lb 12.8 oz (69.3 kg)                 Today's Medication Changes          These changes are accurate as of 8/22/18  2:30 PM.  If you have any questions, ask your nurse or doctor.               Start taking these medicines.        Dose/Directions    albuterol 108 (90 Base) MCG/ACT inhaler   Commonly known as:  PROAIR HFA/PROVENTIL HFA/VENTOLIN HFA   Used for:  Pneumonia of right lower lobe due to infectious organism (H)   Started by:  Yara Chavez NP        Dose:  2 puff   Inhale 2 puffs into the lungs every 6 hours as needed for shortness of breath / dyspnea or wheezing   Quantity:  1 Inhaler   Refills:  0       azithromycin 250 MG tablet   Commonly known as:  ZITHROMAX   Used for:  Pneumonia of right lower lobe due to infectious organism (H)   Started by:  Yara Chavez NP        Two tablets first day, then one tablet daily for four days.   Quantity:  6 tablet   Refills:  0            Where to get your medicines      These medications were sent to [facility], - [place], [place] - [address]  [address], [place] [address]     Phone:  [phone]     albuterol 108 (90 Base) MCG/ACT inhaler    azithromycin 250 MG tablet                Primary Care Provider Office Phone # Fax #    Misbah Hunt [phone]       [address] [facility] [address]        Equal Access to Services     AMBAR OTERO AH: Khadar Hayes, juanito luqmaribell, qaorionta kacaitlin martino. [facility] [phone].    ATENCIÓN: Si lexy boykin, tiene a caban disposición servicios  teo de asistencia lingüística. Meek yoo [phone].    We comply with applicable federal civil rights laws and [place] laws. We do not discriminate on the basis of race, color, national origin, age, disability, sex, sexual orientation, or gender identity.            Thank you!     Thank you for choosing [facility]  for your care. Our goal is always to provide you with excellent care. Hearing back from our patients is one way we can continue to improve our services. Please take a few minutes to complete the written survey that you may receive in the mail after your visit with us. Thank you!             Your Updated Medication List - Protect others around you: Learn how to safely use, store and throw away your medicines at www.disposemymeds.org.          This list is accurate as of 8/22/18  2:30 PM.  Always use your most recent med list.                   Brand Name Dispense Instructions for use Diagnosis    albuterol 108 (90 Base) MCG/ACT inhaler    PROAIR HFA/PROVENTIL HFA/VENTOLIN HFA    1 Inhaler    Inhale 2 puffs into the lungs every 6 hours as needed for shortness of breath / dyspnea or wheezing    Pneumonia of right lower lobe due to infectious organism (H)       ALPRAZolam 1 MG tablet    XANAX    45 tablet    Take 1 tablet (1 mg) by mouth nightly as needed Repeat 0.5 mg at night if needed    Adjustment disorder with anxious mood       azithromycin 250 MG tablet    ZITHROMAX    6 tablet    Two tablets first day, then one tablet daily for four days.    Pneumonia of right lower lobe due to infectious organism (H)       busPIRone 10 MG tablet    BUSPAR    90 tablet    Take 1 tablet (10 mg) by mouth 3 times daily as needed    Adjustment disorder with anxious mood       citalopram 40 MG tablet    celeXA    30 tablet    TAKE 1 TABLET BY MOUTH ONCE DAILY.    Anxiety and depression       fluticasone 50 MCG/ACT spray    FLONASE     Spray 1 spray in nostril 2 times daily

## 2018-08-24 ASSESSMENT — ANXIETY QUESTIONNAIRES: GAD7 TOTAL SCORE: 0

## 2018-08-24 ASSESSMENT — PATIENT HEALTH QUESTIONNAIRE - PHQ9: SUM OF ALL RESPONSES TO PHQ QUESTIONS 1-9: 0

## 2018-12-21 DIAGNOSIS — F43.22 ADJUSTMENT DISORDER WITH ANXIOUS MOOD: ICD-10-CM

## 2018-12-24 RX ORDER — ALPRAZOLAM 1 MG
TABLET ORAL
Qty: 45 TABLET | Refills: 5 | Status: SHIPPED | OUTPATIENT
Start: 2018-12-24 | End: 2020-05-12

## 2018-12-24 NOTE — TELEPHONE ENCOUNTER
Patient walked in today asking when her RX would be sent over to pharmacy.  Found request in refills, told her that I'd route to PCP now for attention.  Last office visit with PCP 7/9/18, last refill given 7/9/18 of 45 with 5 refills.  ALPRAZolam (XANAX) 1 MG tablet, Take 1 tablet (1 mg) by mouth nightly as needed Repeat 0.5 mg at night if needed.  Unable to complete prescription refill per RN Medication Refill Policy. Rita Hurst 12/24/2018 10:37 AM

## 2018-12-24 NOTE — TELEPHONE ENCOUNTER
Called patient and verified last name and . Informed her of the medication being approved and no further questions.    Rula Villagomez LPN on 2018 at 11:20 AM

## 2019-01-10 DIAGNOSIS — F32.A ANXIETY AND DEPRESSION: ICD-10-CM

## 2019-01-10 DIAGNOSIS — F41.9 ANXIETY AND DEPRESSION: ICD-10-CM

## 2019-01-11 RX ORDER — CITALOPRAM HYDROBROMIDE 40 MG/1
TABLET ORAL
Qty: 30 TABLET | Refills: 2 | Status: SHIPPED | OUTPATIENT
Start: 2019-01-11 | End: 2019-04-12

## 2019-01-11 NOTE — TELEPHONE ENCOUNTER
"Refill request from [facility] for:  citalopram (CELEXA) 40 MG tablet    LOV 8/22/2018 with Maria E Chavez NP    Last filled 7/19/2018 for 30 X 5 refills    LOV with Misbah Hunt MD 7/9/2018  Celexa and BuSpar have been stable and doing well for her.    Advised pt to schedule an exam and Pap    No upcoming appt noted at this time    Will refill, send letter, and add appt reminder note to pharm in Rx    Requested Prescriptions   Pending Prescriptions Disp Refills     citalopram (CELEXA) 40 MG tablet [Pharmacy Med Name: CITALOPRAM HBR 40 MG TABLET] 30 tablet 5     Sig: TAKE 1 TABLET BY MOUTH ONCE DAILY.    SSRIs Protocol Passed - 1/10/2019  1:48 AM       Passed - Recent (12 mo) or future (30 days) visit within the authorizing provider's specialty    Patient had office visit in the last 12 months or has a visit in the next 30 days with authorizing provider or within the authorizing provider's specialty.  See \"Patient Info\" tab in inbasket, or \"Choose Columns\" in Meds & Orders section of the refill encounter.           Passed - Medication is active on med list       Passed - Patient is age 18 or older       Passed - No active pregnancy on record       Passed - No positive pregnancy test in last 12 months        Lilly Crenshaw RN  ....................  1/11/2019   9:54 AM      "

## 2019-04-10 DIAGNOSIS — F43.22 ADJUSTMENT DISORDER WITH ANXIOUS MOOD: ICD-10-CM

## 2019-04-12 DIAGNOSIS — F41.9 ANXIETY AND DEPRESSION: ICD-10-CM

## 2019-04-12 DIAGNOSIS — F32.A ANXIETY AND DEPRESSION: ICD-10-CM

## 2019-04-12 RX ORDER — BUSPIRONE HYDROCHLORIDE 10 MG/1
10 TABLET ORAL 3 TIMES DAILY PRN
Qty: 90 TABLET | Refills: 10 | OUTPATIENT
Start: 2019-04-12

## 2019-04-12 NOTE — TELEPHONE ENCOUNTER
Filled 5/23/18 #90 x 11. Due 5/23/19. Pharmacy alerted. Unable to complete prescription refill per RNMedication Refill Policy.................... Gretchen Encarnacion ....................  4/12/2019   3:59 PM    busPIRone (BUSPAR) 10 MG tablet 90 tablet 11 5/23/2018  No   Sig - Route: Take 1 tablet (10 mg) by mouth 3 times daily as needed - Oral   Sent to pharmacy as: busPIRone (BUSPAR) 10 MG tablet   Class: E-Prescribe   Order: [id number]   E-Prescribing Status: Receipt confirmed by pharmacy (5/23/2018  3:41 PM CDT)   Printout Tracking     External Result Report   Pharmacy     [facility] [id number] [place] [facility] - [place], [place] - [facility] [address]

## 2019-04-16 RX ORDER — CITALOPRAM HYDROBROMIDE 40 MG/1
TABLET ORAL
Qty: 90 TABLET | Refills: 1 | Status: SHIPPED | OUTPATIENT
Start: 2019-04-16 | End: 2020-05-12

## 2019-04-16 NOTE — TELEPHONE ENCOUNTER
[facility] sent Rx request for the following:      CITALOPRAM HBR 40 MG TABLET  Sig: TAKE 1 TABLET BY MOUTH EVERY DAY  Last Prescription Date:   1/11/19  Last Fill Qty/Refills:         30, R-2    Last Office Visit:              8/22/18 ([facility])          7/9/18 (Former [facility] Pt)  Future Office visit:           None.    Per chart review, Pt was sent reminder letter on 1/11/19 along with #30, R-2 manoj refill and note to pharmacy. Patient failed to schedule appointment.    Routing refill request to provider for review/approval because:  SSRIs Protocol Failed4/16 9:30 AM   Recent (12 mo) or future (30 days) visit within the authorizing provider's specialty     Attempted reaching Pt, to assist her in scheduling appointment. Left message on machine to call back. Ruth Clark RN .............. 4/16/2019  9:35 AM.

## 2019-04-16 NOTE — TELEPHONE ENCOUNTER
Noted approval of request:    citalopram (CELEXA) 40 MG tablet 90 tablet 1 4/16/2019  No   Sig: TAKE 1 TABLET BY MOUTH EVERY DAY   [facility] [address] [facility] - [place], [place] - [address]    Patient notified of this information. Ruth Clark RN .............. 4/16/2019  1:35 PM

## 2019-04-16 NOTE — TELEPHONE ENCOUNTER
Patient returned call; her last name and  were verified. Pt aware she needs to choose a new provider. Pt reports that she received a new insurance card in December, but didn't have insurance coverage, when she went to use it in January. Pt has made several attempts to reach [employer] regarding this, but has not been able to wait on hold long enough to speak with a representative. Pt verbalized plan to attempt reaching them again. Meanwhile, Pt states she is ALMOST OUT OF MEDICATION.    In clinical absence of Dr. Hunt, patient is requesting that this message be sent to the primary provider's Teamlet for consideration please.      Ruth Clark RN .............. 2019  10:23 AM

## 2019-04-23 DIAGNOSIS — F43.22 ADJUSTMENT DISORDER WITH ANXIOUS MOOD: Primary | ICD-10-CM

## 2019-04-23 RX ORDER — BUSPIRONE HYDROCHLORIDE 10 MG/1
10 TABLET ORAL 3 TIMES DAILY PRN
Qty: 90 TABLET | Refills: 0 | Status: SHIPPED | OUTPATIENT
Start: 2019-04-23 | End: 2019-05-25

## 2019-04-23 NOTE — TELEPHONE ENCOUNTER
[facility] pharmacy in [place] was requesting the following medication:     busPIRone (BUSPAR) 10 MG tablet  Sig: Take 1 tablet (10 mg) by mouth 3 times daily as needed  Last Written Prescription Date:  5/23/18  Last Fill Quantity: 90,  # refills: 11   Last office visit: 7/9/2018 with prescribing provider:  Dr. Hunt   Future Office Visit:  none     Atypical Antidepressants Protocol Failed4/23 4:13 PM   Recent (12 mo) or future (30 days) visit within the authorizing provider's specialty        Per previous note, patient was attempting to obtain health insurance. Noted Liliana Treadwell approved last requested medication as a teamlet. Will route to Liliana for refill consideration.     Naheed Gee RN on 4/23/2019 at 4:23 PM

## 2019-05-25 DIAGNOSIS — F43.22 ADJUSTMENT DISORDER WITH ANXIOUS MOOD: ICD-10-CM

## 2019-05-28 NOTE — TELEPHONE ENCOUNTER
[facility] for the following:      BUSPIRONE HCL 10 MG TABLET  Sig: Take 1 tablet (10 mg) by mouth 3 times daily as needed  Last Prescription Date:   4/23/19  Last Fill Qty/Refills:         90, R-0    Last Office Visit:              7/9/18 (Former [facility] Pt)  Future Office visit:           None.    Per refill encounter, dated 4/12/19, Pt was attempting to obtain health insurance. Liliana Treadwell approved 30-day limited supply, as teamlet, on 4/23.    Attempted reaching Pt to determine status of health insurance. Left message on machine to call back. Ruth Clark RN .............. 5/28/2019  4:52 PM

## 2019-05-29 RX ORDER — BUSPIRONE HYDROCHLORIDE 10 MG/1
10 TABLET ORAL 3 TIMES DAILY PRN
Qty: 90 TABLET | Refills: 1 | Status: SHIPPED | OUTPATIENT
Start: 2019-05-29 | End: 2020-05-07

## 2019-05-29 NOTE — TELEPHONE ENCOUNTER
Patient notified of Liliana Treadwell's response. She talked with the  this morning and feels like she got some help and resolution on things. No further concerns at this time.

## 2019-05-29 NOTE — TELEPHONE ENCOUNTER
I will refill as requested    We will route to nursing [place] to let patient know this was sent to the pharmacy so she can pick it up    Would recommend she call our  if she has any questions regarding her bill her statement and if she is eligible for any insurance plans    She could try project care on Tuesdays from 5 30-7 30  Would recommend she call the number and talk with Modesta the  about eligibility and getting her medications and labs done through the free clinic    YAYA Villegas CNP   May 29, 2019

## 2019-05-29 NOTE — TELEPHONE ENCOUNTER
"Called and spoke to Patient after verifying last name and date of birth. Pt states, \"I work every day until 5:30 and honestly don't have time during the day, to call [facility] and figure all of this out, and honestly, I need help with it.\" Pt explained that she received new insurance card in December and her January bill came with \"???\" in the payment due section. She decided to wait for 2nd statement. She came to clinic in January, then received letter from clinic, notifying her that she did not have coverage. Pt has made several failed attempts to get though to [employer]. She has had link sent multiple times to her email, but never receives it. A friend suggested contacting [employer], for more help. Writer provided hours and phone number for Jan, and Pt verbalized plan to call them for help. Pt also noted she has approximately 25 tablets remaining, as she takes \"up to 3 per day.\"    Will route information to Liliana Treadwell, for her review and consideration. Ruth Clark RN .............. 5/29/2019  9:53 AM    "

## 2020-03-11 ENCOUNTER — HEALTH MAINTENANCE LETTER (OUTPATIENT)
Age: 30
End: 2020-03-11

## 2020-05-07 ENCOUNTER — MYC REFILL (OUTPATIENT)
Dept: FAMILY MEDICINE | Facility: OTHER | Age: 30
End: 2020-05-07

## 2020-05-07 DIAGNOSIS — F43.22 ADJUSTMENT DISORDER WITH ANXIOUS MOOD: ICD-10-CM

## 2020-05-07 RX ORDER — BUSPIRONE HYDROCHLORIDE 10 MG/1
10 TABLET ORAL 3 TIMES DAILY PRN
Qty: 60 TABLET | Refills: 0 | Status: SHIPPED | OUTPATIENT
Start: 2020-05-07 | End: 2020-06-06

## 2020-05-07 NOTE — TELEPHONE ENCOUNTER
"\"Applied for health insurance, requested an appointment, my anxiety is too overwhelming right now i can't function properly. I had enough extras to get me through yesterday but I thought i had more, i couldnt find any so couldnt wean off of them and haven't had any since yesterday morning and was hoping maybe i could get a few until i can see a doctor.\"    Pt Priscillat sent Rx request for the following:   busPIRone (BUSPAR) 10 MG tablet   Sig:  Take 1 tablet (10 mg) by mouth 3 times daily as needed    Last Prescription Date:   5/29/2019  Last Fill Qty/Refills:         90, R-1    Last Office Visit:              8/22/2018   Future Office visit:           None    Routing refill request to provider for review/approval because:  Atypical Antidepressants Protocol Failed   Recent (12 mo) or future (30 days) visit within the authorizing provider's specialty     Will route to teamlet for consideration    Lilly Crenshaw RN  ....................  5/7/2020   2:46 PM        "

## 2020-05-10 ENCOUNTER — MYC REFILL (OUTPATIENT)
Dept: FAMILY MEDICINE | Facility: OTHER | Age: 30
End: 2020-05-10

## 2020-05-10 DIAGNOSIS — F32.A ANXIETY AND DEPRESSION: ICD-10-CM

## 2020-05-10 DIAGNOSIS — F43.22 ADJUSTMENT DISORDER WITH ANXIOUS MOOD: ICD-10-CM

## 2020-05-10 DIAGNOSIS — F41.9 ANXIETY AND DEPRESSION: ICD-10-CM

## 2020-05-10 RX ORDER — ALPRAZOLAM 1 MG
TABLET ORAL
Qty: 45 TABLET | Refills: 5 | Status: CANCELLED | OUTPATIENT
Start: 2020-05-10

## 2020-05-10 RX ORDER — CITALOPRAM HYDROBROMIDE 40 MG/1
40 TABLET ORAL DAILY
Qty: 90 TABLET | Refills: 1 | Status: CANCELLED | OUTPATIENT
Start: 2020-05-10

## 2020-05-11 ENCOUNTER — MYC REFILL (OUTPATIENT)
Dept: FAMILY MEDICINE | Facility: OTHER | Age: 30
End: 2020-05-11

## 2020-05-11 DIAGNOSIS — F41.9 ANXIETY AND DEPRESSION: ICD-10-CM

## 2020-05-11 DIAGNOSIS — F43.22 ADJUSTMENT DISORDER WITH ANXIOUS MOOD: ICD-10-CM

## 2020-05-11 DIAGNOSIS — F32.A ANXIETY AND DEPRESSION: ICD-10-CM

## 2020-05-11 RX ORDER — ALPRAZOLAM 1 MG
TABLET ORAL
Qty: 45 TABLET | Refills: 5 | Status: CANCELLED | OUTPATIENT
Start: 2020-05-11

## 2020-05-12 ENCOUNTER — VIRTUAL VISIT (OUTPATIENT)
Dept: FAMILY MEDICINE | Facility: OTHER | Age: 30
End: 2020-05-12
Attending: PHYSICIAN ASSISTANT
Payer: MEDICAID

## 2020-05-12 DIAGNOSIS — F41.9 ANXIETY: ICD-10-CM

## 2020-05-12 DIAGNOSIS — F32.1 MODERATE MAJOR DEPRESSION (H): Primary | ICD-10-CM

## 2020-05-12 PROCEDURE — 99213 OFFICE O/P EST LOW 20 MIN: CPT | Mod: 95 | Performed by: PHYSICIAN ASSISTANT

## 2020-05-12 RX ORDER — CITALOPRAM HYDROBROMIDE 20 MG/1
20 TABLET ORAL DAILY
Qty: 30 TABLET | Refills: 0 | Status: SHIPPED | OUTPATIENT
Start: 2020-05-12 | End: 2020-06-08

## 2020-05-12 RX ORDER — ALPRAZOLAM 1 MG
TABLET ORAL
Qty: 30 TABLET | Refills: 0 | Status: SHIPPED | OUTPATIENT
Start: 2020-05-12 | End: 2020-06-08

## 2020-05-12 ASSESSMENT — ANXIETY QUESTIONNAIRES
3. WORRYING TOO MUCH ABOUT DIFFERENT THINGS: NEARLY EVERY DAY
5. BEING SO RESTLESS THAT IT IS HARD TO SIT STILL: SEVERAL DAYS
6. BECOMING EASILY ANNOYED OR IRRITABLE: MORE THAN HALF THE DAYS
7. FEELING AFRAID AS IF SOMETHING AWFUL MIGHT HAPPEN: SEVERAL DAYS
1. FEELING NERVOUS, ANXIOUS, OR ON EDGE: NEARLY EVERY DAY
IF YOU CHECKED OFF ANY PROBLEMS ON THIS QUESTIONNAIRE, HOW DIFFICULT HAVE THESE PROBLEMS MADE IT FOR YOU TO DO YOUR WORK, TAKE CARE OF THINGS AT HOME, OR GET ALONG WITH OTHER PEOPLE: SOMEWHAT DIFFICULT
2. NOT BEING ABLE TO STOP OR CONTROL WORRYING: NEARLY EVERY DAY
GAD7 TOTAL SCORE: 15

## 2020-05-12 ASSESSMENT — PATIENT HEALTH QUESTIONNAIRE - PHQ9
5. POOR APPETITE OR OVEREATING: MORE THAN HALF THE DAYS
SUM OF ALL RESPONSES TO PHQ QUESTIONS 1-9: 18

## 2020-05-12 NOTE — PATIENT INSTRUCTIONS
Patient Education     Treating Anxiety Disorders with Medicine  An anxiety disorder can make you feel nervous or apprehensive, even without a clear reason. In people age 65 and older, generalized anxiety disorder is one of the most commonly diagnosed anxiety disorders. Many times it occurs with depression. Certain anxiety disorders can cause intense feelings of fear or panic. You may even have physical symptoms such as a racing heartbeat, sweating, or dizziness. If you have these feelings, you don t have to suffer anymore. Treatment to help you overcome your fears will likely include therapy (also called counseling). Medicine may also be prescribed to help control your symptoms.    Medicines  Certain medicines may be prescribed to help control your symptoms. So you may feel less anxious. You may also feel able to move forward with therapy. At first, medicines and dosages may need to be adjusted to find what works best for you. Try to be patient. Tell your healthcare provider how a medicine makes you feel. This way, you can work together to find the treatment that s best for you. Keep in mind that medicines can have side effects. Talk with your provider about any side effects that are bothering you. Changing the dose or type of medicine may help. Don t stop taking medicine on your own. That can cause symptoms to come back or cause dangerous withdrawal symptoms.    Anti-anxiety medicine. This medicine eases symptoms and helps you relax. Your healthcare provider will explain when and how to use it. It may be prescribed for use before situations that make you anxious. You may also be told to take medicine on a regular schedule. Anti-anxiety medicine may make you feel a little sleepy or  out of it.  Don t drive a car or operate machinery while on this medicine, until you know how it affects you.  Never use alcohol or other drugs with anti-anxiety medicines. This could result in loss of muscular control, sedation, coma,  or death. Also, use only the amount of medicine prescribed for you. If you think you may have taken too much, get emergency care right away. Never share your medications with others. Store these medications in a safe place that can't be accessed by children or visitors.  Keep taking medicines as prescribed  Never change your dosage, share or use another person's medicine, or stop taking your medicines without talking to your healthcare provider first. Keep the following in mind:    Some medicines must be taken on a schedule. Make this part of your daily routine. For instance, always take your pill before brushing your teeth. A pillbox can help you remember if you ve taken your medicine each day.    Medicines are often taken for 6 to 12 months. Your healthcare provider will then evaluate whether you need to stay on them. Many people who have also had therapy may no longer need medicine to manage anxiety.    You may need to stop taking medicine slowly to give your body time to adjust. When it s time to stop, your healthcare provider will tell you more. Remember: Never stop taking your medicine without talking to your provider first.    If symptoms return, you may need to start taking medicines again. This isn t your fault. It s just the nature of your anxiety disorder.    Side effects. Medicines may cause side effects. Ask your healthcare provider or pharmacist what you can expect. They may have ideas for avoiding some side effects.    Sexual problems. Some antidepressants can affect your desire for sex or your ability to have an orgasm. A change in dosage or medicine often solves the problem. If you have a sexual side effect that concerns you, tell your healthcare provider.    Addiction. If you ve never had a problem with drugs or alcohol, you may not have a problem with medicines used to treat anxiety disorders. But always discuss the medicines with your healthcare provider before taking them. If you have a history  of addiction, you may not be able to use certain medicines used to treat anxiety disorders.    Medicine interactions. Always check with your pharmacist before using any over-the-counter medicines (OTCs), including herbal supplements. Some OTCs may interact with your anti-anxiety medications and increase or decrease their effectiveness.    Date Last Reviewed: 5/1/2017 2000-2019 [employer]. [address] [place] [address]. All rights reserved. This information is not intended as a substitute for professional medical care. Always follow your healthcare professional's instructions.             Patient Education     Depression  Depression is one of the most common mental health problems today. It is not just a state of unhappiness or sadness. It is a true disease. The cause seems to be related to a decrease in chemicals that transmit signals in the brain. Having a family history of depression, alcoholism, or suicide increases the risk. Chronic illness, chronic pain, migraine headaches, and high emotional stress also increase the risk.  Depression is something we tend to recognize in others, but may have a hard time seeing in ourselves. It can show in many physical and emotional ways:    Loss of appetite    Overeating    Not being able to sleep    Sleeping too much    Tiredness not related to physical exertion    Restlessness or irritability    Slowness of movement or speech    Feeling depressed or withdrawn    Loss of interest in things you once enjoyed    Trouble concentrating, poor memory, trouble making decisions    Thoughts of harming or killing oneself, or thoughts that life is not worth living    Low self-esteem  The treatment for depression may include both medicine and psychotherapy. Antidepressants can reduce suffering and can improve the ability to function during the depressed period. Therapy can offer emotional support and help you understand emotional factors that may be causing  the depression.  Home care    Ongoing care and support help people manage this disease. Find a healthcare provider and therapist who meet your needs. Seek help when you feel like you may be getting ill.    Be kind to yourself. Make it a point to do things that you enjoy (gardening, walking in nature, going to a movie). Reward yourself for small successes.    Take care of your physical body. Eat a balanced diet (low in saturated fat and high in fruits and vegetables). Exercise at least 3 times a week for 30 minutes. Even mild-moderate exercise (like brisk walking) can make you feel better.    Don't drink alcohol, which can make depression worse.    Take medicine as prescribed.    Tell each of your healthcare providers about all of the prescription and over-the-counter medicines, vitamins, and supplements you take. Certain supplements interact with medicines and can result in dangerous side effects. Ask your pharmacist when you have questions about medicine interactions.    Talk with your family and trusted friends about your feelings and thoughts. Ask them to help you recognize behavior changes early so you can get help and, if needed, medicine can be adjusted.    Follow-up care  Follow up with your healthcare provider, or as advised.  Call 911  Call 911 if you:    Have suicidal thoughts, a suicide plan, and the means to carry out the plan; or serious thoughts of hurting someone else     Have trouble breathing    Are very confused    Feel very drowsy or have trouble awakening    Faint or lose consciousness    Have new chest pain that becomes more severe, lasts longer, or spreads into your shoulder, arm, neck, jaw, or back  When to seek medical advice  Call your healthcare provider right away if any of these happen:    Feeling extreme depression, fear, anxiety, or anger toward yourself or others    Feeling out of control    Feeling that you may try to harm yourself or another    Hearing voices that others do not  hear    Seeing things that others do not see    Can t sleep or eat for 3 days in a row    Friends or family express concern over your behavior and ask you to seek help  Date Last Reviewed: 10/1/2017    0814-1885 [employer]. [address] [place] [address], [place] [address]. All rights reserved. This information is not intended as a substitute for professional medical care. Always follow your healthcare professional's instructions.

## 2020-05-12 NOTE — PROGRESS NOTES
"Tray Talavera is a 30 year old female who is being evaluated via a billable video visit.      The patient has been notified of following:     \"This video visit will be conducted via a call between you and your physician/provider. We have found that certain health care needs can be provided without the need for an in-person physical exam.  This service lets us provide the care you need with a video conversation.  If a prescription is necessary we can send it directly to your pharmacy.  If lab work is needed we can place an order for that and you can then stop by our lab to have the test done at a later time.    Video visits are billed at different rates depending on your insurance coverage.  Please reach out to your insurance provider with any questions.    If during the course of the call the physician/provider feels a video visit is not appropriate, you will not be charged for this service.\"    Patient has given verbal consent for Video visit? Yes    How would you like to obtain your AVS? Florencio    Patient would like the video invitation sent by: Text to cell phone: [phone]    Will anyone else be joining your video visit? No    Subjective     Tray Talavera is a 30 year old female who presents today via video visit for the following health issues:    HPI  Patient is calling regarding depression and anxiety. States she had previously been treated for both anxiety and depression but then lost her insurance. Was taking Buspar 10 mg TID, Celexa 40 mg once daily, and Xanax 0.5-1 mg nightly. Has not been taking any medication for over a year due to lack of health insurance. States her anxiety and depression were uncontrolled. Recently did receive prescription for Buspar 10 mg TID last week. Does feel like that is helping her anxiety some already. Is requesting to be started back on Celexa and Xanax now that she has applied for health insurance again. States she felt the best when she was on these three " medications at the above dosages. Is noticing mixed anxiety and depression symptoms. Feels her depression is worse during the winter months. Has also noticed increase in depression and anxiety due to current pandemic and stay at home orders. Has a strong support system at home. Has worked with a therapist in the past. Denies self-harm, suicidal, or homicidal thoughts.       Video Start Time: 3:01 PM    PAST MEDICAL HISTORY:   Past Medical History:   Diagnosis Date     Personal history of other mental and behavioral disorders     No Comments Provided     Personal history of urinary infection     No Comments Provided       PAST SURGICAL HISTORY:   Past Surgical History:   Procedure Laterality Date     APPENDECTOMY OPEN      09/05,Open, [facility]     ESOPHAGOSCOPY, GASTROSCOPY, DUODENOSCOPY (EGD), COMBINED      02/23/2012,by Dr. Jama. bile reflux and gastropathy       FAMILY HISTORY:   Family History   Problem Relation Age of Onset     Diabetes Mother         Diabetes     Other - See Comments Mother         Psychiatric illness,depression       SOCIAL HISTORY:   Social History     Tobacco Use     Smoking status: Current Every Day Smoker     Packs/day: 0.75     Types: Cigarettes     Smokeless tobacco: Never Used   Substance Use Topics     Alcohol use: Yes     Comment: Alcoholic Drinks/day: Rarely        Allergies   Allergen Reactions     Trazodone      Other reaction(s): Confusion  Gave her a hangover effect.     Amoxicillin Rash     Current Outpatient Medications   Medication     albuterol (PROAIR HFA/PROVENTIL HFA/VENTOLIN HFA) 108 (90 Base) MCG/ACT inhaler     ALPRAZolam (XANAX) 1 MG tablet     busPIRone (BUSPAR) 10 MG tablet     citalopram (CELEXA) 40 MG tablet     fluticasone (FLONASE) 50 MCG/ACT spray     No current facility-administered medications for this visit.          Reviewed and updated as needed this visit by Provider         Review of Systems   Constitutional, HEENT, cardiovascular, pulmonary, gi and gu  "systems are negative, except as otherwise noted.      Objective    There were no vitals taken for this visit.  Estimated body mass index is 26.81 kg/m  as calculated from the following:    Height as of 7/9/18: 1.664 m (5' 5.5\").    Weight as of 8/22/18: 74.2 kg (163 lb 9.6 oz).  Physical Exam     GENERAL: Healthy, alert and no distress  EYES: Eyes grossly normal to inspection.  No discharge or erythema, or obvious scleral/conjunctival abnormalities.  RESP: No audible wheeze, cough, or visible cyanosis.  No visible retractions or increased work of breathing.    SKIN: Visible skin clear. No significant rash, abnormal pigmentation or lesions.  NEURO: Cranial nerves grossly intact.  Mentation and speech appropriate for age.  PSYCH: Mentation appears normal, affect normal/bright, judgement and insight intact, normal speech and appearance well-groomed.            Assessment & Plan   Assessment  1. Moderate major depression (H)    2. Anxiety        Plan  1. Will restart patient on Celexa. Will start at 10 mg once daily for 4-5 days then taper up to 20 mg once daily. Maintain this dose for 4-6 weeks. Educated on medication, use, and side effects. Follow up in 4 weeks for reche or sooner if needed. Discussed talking with therapist, patient is not interested in this at this time. Given CRT information. Encouraged daily exercise to help with symptoms as well.   2. Patient has already been restarted on Buspar. Will prescribe Xanax to be used as needed at night to help with sleeping.  queried and is appropriate. Educated on medication, use, and side effects.       Lakia Erazo PA-C  [facility]          Video-Visit Details    Type of service:  Video Visit    Video End Time:3:17 PM    Originating Location (pt. Location): Home    Distant Location (provider location):  [facility]     Platform used for Video Visit: Josué Erazo PA-C        "

## 2020-05-13 RX ORDER — CITALOPRAM HYDROBROMIDE 40 MG/1
40 TABLET ORAL DAILY
Qty: 90 TABLET | Refills: 1 | OUTPATIENT
Start: 2020-05-13

## 2020-05-13 ASSESSMENT — ANXIETY QUESTIONNAIRES: GAD7 TOTAL SCORE: 15

## 2020-05-13 NOTE — TELEPHONE ENCOUNTER
Refill request for Xanax which was filled for 30 days 5/12/2020    Duplicate.    Lilly Crenshaw RN  ....................  5/13/2020   11:22 AM

## 2020-05-13 NOTE — TELEPHONE ENCOUNTER
Noted Xanax filled in virtual visit yesterday 5/12/2020    Refill refused.     Lilly Crenshaw RN  ....................  5/13/2020   10:13 AM

## 2020-05-13 NOTE — TELEPHONE ENCOUNTER
Jacob Matias sent Rx request for the following:   citalopram (CELEXA) 40 MG tablet   Sig:  Take 1 tablet (40 mg) by mouth daily     Last Prescription Date:   5/12/2020  Last Fill Qty/Refills:         30, R-0    Last Office Visit:              5/12/2020     Duplicate-filled in virtual visit yesterday as a citalopram 20 mg daily.    Will refuse at this time.     Lilly Crenshaw RN  ....................  5/13/2020   10:11 AM

## 2020-06-06 ENCOUNTER — MYC REFILL (OUTPATIENT)
Dept: FAMILY MEDICINE | Facility: OTHER | Age: 30
End: 2020-06-06

## 2020-06-06 DIAGNOSIS — F41.9 ANXIETY: ICD-10-CM

## 2020-06-06 RX ORDER — ALPRAZOLAM 1 MG
TABLET ORAL
Qty: 30 TABLET | Refills: 0 | Status: CANCELLED | OUTPATIENT
Start: 2020-06-06

## 2020-06-08 ENCOUNTER — MYC REFILL (OUTPATIENT)
Dept: FAMILY MEDICINE | Facility: OTHER | Age: 30
End: 2020-06-08

## 2020-06-08 DIAGNOSIS — F41.9 ANXIETY: ICD-10-CM

## 2020-06-08 DIAGNOSIS — F43.22 ADJUSTMENT DISORDER WITH ANXIOUS MOOD: ICD-10-CM

## 2020-06-08 DIAGNOSIS — F32.1 MODERATE MAJOR DEPRESSION (H): ICD-10-CM

## 2020-06-08 RX ORDER — CITALOPRAM HYDROBROMIDE 20 MG/1
20 TABLET ORAL DAILY
Qty: 30 TABLET | Refills: 0 | Status: SHIPPED | OUTPATIENT
Start: 2020-06-08 | End: 2020-07-08

## 2020-06-08 RX ORDER — ALPRAZOLAM 1 MG
TABLET ORAL
Qty: 30 TABLET | Refills: 0 | Status: SHIPPED | OUTPATIENT
Start: 2020-06-08 | End: 2020-07-08

## 2020-06-08 RX ORDER — BUSPIRONE HYDROCHLORIDE 10 MG/1
10 TABLET ORAL 3 TIMES DAILY PRN
Qty: 60 TABLET | Refills: 0 | Status: CANCELLED | OUTPATIENT
Start: 2020-06-08

## 2020-06-10 NOTE — TELEPHONE ENCOUNTER
Refill request via [employer] for busPIRone (BUSPAR) 10 MG tablet    Noted med was filled at [facility] for a limited supply after speaking with Pt on 6/8/2020 in separate encounter (receipt confirmed by pharmacy).     Replied to Pt via [employer] that Rx was sent to [facility].     Prescription refused.  This is a duplicate request.      Lilly Crenshaw RN.......6/10/2020 9:06 AM    
PAIN SCALE 4 OF 10.

## 2020-07-06 ENCOUNTER — MYC REFILL (OUTPATIENT)
Dept: FAMILY MEDICINE | Facility: OTHER | Age: 30
End: 2020-07-06

## 2020-07-06 DIAGNOSIS — F43.22 ADJUSTMENT DISORDER WITH ANXIOUS MOOD: ICD-10-CM

## 2020-07-06 DIAGNOSIS — F32.1 MODERATE MAJOR DEPRESSION (H): ICD-10-CM

## 2020-07-06 DIAGNOSIS — F41.9 ANXIETY: ICD-10-CM

## 2020-07-06 RX ORDER — ALPRAZOLAM 1 MG
TABLET ORAL
Qty: 30 TABLET | Refills: 0 | Status: CANCELLED | OUTPATIENT
Start: 2020-07-06

## 2020-07-06 RX ORDER — BUSPIRONE HYDROCHLORIDE 10 MG/1
10 TABLET ORAL 3 TIMES DAILY
Qty: 60 TABLET | Refills: 0 | Status: CANCELLED | OUTPATIENT
Start: 2020-07-06

## 2020-07-06 RX ORDER — CITALOPRAM HYDROBROMIDE 20 MG/1
20 TABLET ORAL DAILY
Qty: 30 TABLET | Refills: 0 | Status: CANCELLED | OUTPATIENT
Start: 2020-07-06

## 2020-07-07 ENCOUNTER — MYC REFILL (OUTPATIENT)
Dept: FAMILY MEDICINE | Facility: OTHER | Age: 30
End: 2020-07-07

## 2020-07-07 DIAGNOSIS — F41.9 ANXIETY: ICD-10-CM

## 2020-07-07 DIAGNOSIS — F32.1 MODERATE MAJOR DEPRESSION (H): ICD-10-CM

## 2020-07-07 DIAGNOSIS — F43.22 ADJUSTMENT DISORDER WITH ANXIOUS MOOD: ICD-10-CM

## 2020-07-07 RX ORDER — BUSPIRONE HYDROCHLORIDE 10 MG/1
10 TABLET ORAL 3 TIMES DAILY
Qty: 60 TABLET | Refills: 0 | Status: CANCELLED | OUTPATIENT
Start: 2020-07-07

## 2020-07-07 RX ORDER — ALPRAZOLAM 1 MG
TABLET ORAL
Qty: 30 TABLET | Refills: 0 | Status: CANCELLED | OUTPATIENT
Start: 2020-07-07

## 2020-07-07 RX ORDER — CITALOPRAM HYDROBROMIDE 20 MG/1
20 TABLET ORAL DAILY
Qty: 30 TABLET | Refills: 0 | Status: CANCELLED | OUTPATIENT
Start: 2020-07-07

## 2020-07-08 NOTE — TELEPHONE ENCOUNTER
Priscillat message from Pt    Refills have been requested for the following medications:      busPIRone (BUSPAR) 10 MG tablet [Lakia Erazo PA-C]    citalopram (CELEXA) 20 MG tablet [Lakia Erazo PA-C]    ALPRAZolam (XANAX) 1 MG tablet [Lakia Erazo PA-C]     Preferred pharmacy: [address]    Duplicate as noted refill request 7/6/2020 sent to provider for refill consideration.  Will remove medication order requests from encounter and close.      Will send My Chart message back to Pt, advising they contact their pharmacy for refill requests in the future.    Lilly Crenshaw RN  ....................  7/8/2020   4:16 PM

## 2020-08-10 ENCOUNTER — MYC REFILL (OUTPATIENT)
Dept: FAMILY MEDICINE | Facility: OTHER | Age: 30
End: 2020-08-10

## 2020-08-10 DIAGNOSIS — F41.9 ANXIETY: ICD-10-CM

## 2020-08-10 DIAGNOSIS — F43.22 ADJUSTMENT DISORDER WITH ANXIOUS MOOD: ICD-10-CM

## 2020-08-10 DIAGNOSIS — F32.1 MODERATE MAJOR DEPRESSION (H): ICD-10-CM

## 2020-08-10 RX ORDER — ALPRAZOLAM 1 MG
TABLET ORAL
Qty: 30 TABLET | Refills: 2 | Status: CANCELLED | OUTPATIENT
Start: 2020-08-10

## 2020-08-10 RX ORDER — CITALOPRAM HYDROBROMIDE 20 MG/1
20 TABLET ORAL DAILY
Qty: 30 TABLET | Refills: 2 | Status: CANCELLED | OUTPATIENT
Start: 2020-08-10

## 2020-08-10 RX ORDER — BUSPIRONE HYDROCHLORIDE 10 MG/1
10 TABLET ORAL 3 TIMES DAILY
Qty: 90 TABLET | Refills: 2 | Status: CANCELLED | OUTPATIENT
Start: 2020-08-10

## 2020-08-10 NOTE — TELEPHONE ENCOUNTER
Patient wasn't aware that she had refills at pharmacy and she also is aware that a follow up appointment needs to be made.  Ani Schrader LPN ....................  8/10/2020   11:51 AM

## 2020-10-08 ENCOUNTER — MYC REFILL (OUTPATIENT)
Dept: FAMILY MEDICINE | Facility: OTHER | Age: 30
End: 2020-10-08

## 2020-10-08 DIAGNOSIS — F41.9 ANXIETY: ICD-10-CM

## 2020-10-08 DIAGNOSIS — F32.1 MODERATE MAJOR DEPRESSION (H): ICD-10-CM

## 2020-10-08 DIAGNOSIS — F43.22 ADJUSTMENT DISORDER WITH ANXIOUS MOOD: ICD-10-CM

## 2020-10-08 RX ORDER — CITALOPRAM HYDROBROMIDE 20 MG/1
20 TABLET ORAL DAILY
Qty: 30 TABLET | Refills: 0 | Status: SHIPPED | OUTPATIENT
Start: 2020-10-08 | End: 2020-10-27

## 2020-10-08 RX ORDER — BUSPIRONE HYDROCHLORIDE 10 MG/1
10 TABLET ORAL 3 TIMES DAILY
Qty: 90 TABLET | Refills: 0 | Status: SHIPPED | OUTPATIENT
Start: 2020-10-08 | End: 2020-10-27

## 2020-10-08 RX ORDER — ALPRAZOLAM 1 MG
TABLET ORAL
Qty: 30 TABLET | Refills: 0 | Status: SHIPPED | OUTPATIENT
Start: 2020-10-08 | End: 2020-10-27

## 2020-10-08 NOTE — TELEPHONE ENCOUNTER
One month refill given. Patient must be seen in clinic prior to future refills as she has yet to follow up after initiation of medications 5 months ago.   Lakia Erazo PA-C on 10/8/2020 at 8:56 AM

## 2020-10-23 ENCOUNTER — MYC MEDICAL ADVICE (OUTPATIENT)
Dept: FAMILY MEDICINE | Facility: OTHER | Age: 30
End: 2020-10-23

## 2020-10-23 NOTE — TELEPHONE ENCOUNTER
After verifying pts name and date of birth with pt, pt was offered an appointment om 10/27/20 @1520 for medication management and establish care. Pt was unable to schedule an appointment via [facility].  Kayla Romano LPN

## 2020-10-26 NOTE — PROGRESS NOTES
SUBJECTIVE:     HPI  Tray Talavera is a 30 year old female who presents to clinic today for medication management and is requesting to establish care. Patient has been following with myself via telephone and virtual visits for treatment of depression and anxiety. She was restarted on Celexa in 05/2020, Buspar 10 mg, and Xanax 1mg as needed.     Tray is presenting today feeling stable with her current medications.  Does note that she is able to fall asleep with the Xanax 1 mg but she often is waking up 4 to 5 hours later.  Previously she had been taking 1 mg at bedtime and then a repeat half milligram several hours later to help her get sleep through the night.  Otherwise doing well without medications.  Notes no side effects.  Denies self-harm, suicidal, homicidal thoughts.  No other concerns today.    Of note, patient is due for Pap smear screening.  She does not want this done today.  She will return at a later date to have this completed.      Review of Systems   Per HPI.    PAST MEDICAL HISTORY:   Past Medical History:   Diagnosis Date     Personal history of other mental and behavioral disorders     No Comments Provided     Personal history of urinary infection     No Comments Provided       PAST SURGICAL HISTORY:   Past Surgical History:   Procedure Laterality Date     APPENDECTOMY OPEN      09/05,Open, [facility]     ESOPHAGOSCOPY, GASTROSCOPY, DUODENOSCOPY (EGD), COMBINED      02/23/2012,by Dr. Jama. bile reflux and gastropathy       FAMILY HISTORY:   Family History   Problem Relation Age of Onset     Diabetes Mother         Diabetes     Other - See Comments Mother         Psychiatric illness,depression       SOCIAL HISTORY:   Social History     Tobacco Use     Smoking status: Current Every Day Smoker     Packs/day: 0.75     Types: Cigarettes     Smokeless tobacco: Never Used   Substance Use Topics     Alcohol use: Yes     Comment: Alcoholic Drinks/day: Rarely        Allergies   Allergen Reactions      "Trazodone      Other reaction(s): Confusion  Gave her a hangover effect.     Amoxicillin Rash     Current Outpatient Medications   Medication     ALPRAZolam (XANAX) 1 MG tablet     busPIRone (BUSPAR) 10 MG tablet     citalopram (CELEXA) 20 MG tablet     No current facility-administered medications for this visit.          OBJECTIVE:     /82   Pulse 100   Temp 98.4  F (36.9  C)   Resp 14   Ht 1.676 m (5' 6\")   Wt 61 kg (134 lb 6.4 oz)   LMP 09/22/2020   SpO2 97%   Breastfeeding No   BMI 21.69 kg/m    Body mass index is 21.69 kg/m .  Physical Exam  General: Pleasant, in no apparent distress.  Cardiovascular: Regular rate and rhythm with S1 equal to S2. No murmurs, friction rubs, or gallops.   Respiratory: Lungs are resonant and clear to auscultation bilaterally. No wheezes, crackles, or rhonchi.  Psych: Appropriate mood and affect.        ASSESSMENT/PLAN:     1. Anxiety    2. Adjustment disorder with anxious mood    3. Moderate major depression (H)       Given refills on Celexa, BuSpar.  Increase dose of Xanax to 1 mg nightly with an additional half milligram as needed.  Educated on medications, use, side effects.   Does not work with a therapist at this time. Patient states she will return at a later date to have her Pap smear screening completed.  Follow-up as needed.        Lakia Erazo PA-C  [facility] AND \[facility]\""    "

## 2020-10-27 ENCOUNTER — OFFICE VISIT (OUTPATIENT)
Dept: FAMILY MEDICINE | Facility: OTHER | Age: 30
End: 2020-10-27
Attending: PHYSICIAN ASSISTANT
Payer: COMMERCIAL

## 2020-10-27 VITALS
BODY MASS INDEX: 21.6 KG/M2 | HEIGHT: 66 IN | DIASTOLIC BLOOD PRESSURE: 82 MMHG | HEART RATE: 100 BPM | RESPIRATION RATE: 14 BRPM | WEIGHT: 134.4 LBS | OXYGEN SATURATION: 97 % | SYSTOLIC BLOOD PRESSURE: 136 MMHG | TEMPERATURE: 98.4 F

## 2020-10-27 DIAGNOSIS — F32.1 MODERATE MAJOR DEPRESSION (H): ICD-10-CM

## 2020-10-27 DIAGNOSIS — F41.9 ANXIETY: ICD-10-CM

## 2020-10-27 DIAGNOSIS — F43.22 ADJUSTMENT DISORDER WITH ANXIOUS MOOD: ICD-10-CM

## 2020-10-27 PROCEDURE — 99213 OFFICE O/P EST LOW 20 MIN: CPT | Performed by: PHYSICIAN ASSISTANT

## 2020-10-27 PROCEDURE — G0463 HOSPITAL OUTPT CLINIC VISIT: HCPCS

## 2020-10-27 RX ORDER — ALPRAZOLAM 1 MG
TABLET ORAL
Qty: 105 TABLET | Refills: 3 | Status: SHIPPED | OUTPATIENT
Start: 2020-10-27 | End: 2021-05-20

## 2020-10-27 RX ORDER — BUSPIRONE HYDROCHLORIDE 10 MG/1
10 TABLET ORAL 3 TIMES DAILY
Qty: 90 TABLET | Refills: 3 | Status: SHIPPED | OUTPATIENT
Start: 2020-10-27 | End: 2021-03-17

## 2020-10-27 RX ORDER — CITALOPRAM HYDROBROMIDE 20 MG/1
20 TABLET ORAL DAILY
Qty: 90 TABLET | Refills: 3 | Status: SHIPPED | OUTPATIENT
Start: 2020-10-27 | End: 2021-09-14

## 2020-10-27 ASSESSMENT — ANXIETY QUESTIONNAIRES
7. FEELING AFRAID AS IF SOMETHING AWFUL MIGHT HAPPEN: SEVERAL DAYS
5. BEING SO RESTLESS THAT IT IS HARD TO SIT STILL: MORE THAN HALF THE DAYS
GAD7 TOTAL SCORE: 12
IF YOU CHECKED OFF ANY PROBLEMS ON THIS QUESTIONNAIRE, HOW DIFFICULT HAVE THESE PROBLEMS MADE IT FOR YOU TO DO YOUR WORK, TAKE CARE OF THINGS AT HOME, OR GET ALONG WITH OTHER PEOPLE: SOMEWHAT DIFFICULT
6. BECOMING EASILY ANNOYED OR IRRITABLE: MORE THAN HALF THE DAYS
1. FEELING NERVOUS, ANXIOUS, OR ON EDGE: MORE THAN HALF THE DAYS
2. NOT BEING ABLE TO STOP OR CONTROL WORRYING: MORE THAN HALF THE DAYS
3. WORRYING TOO MUCH ABOUT DIFFERENT THINGS: SEVERAL DAYS

## 2020-10-27 ASSESSMENT — MIFFLIN-ST. JEOR: SCORE: 1336.38

## 2020-10-27 ASSESSMENT — PATIENT HEALTH QUESTIONNAIRE - PHQ9
5. POOR APPETITE OR OVEREATING: MORE THAN HALF THE DAYS
SUM OF ALL RESPONSES TO PHQ QUESTIONS 1-9: 9

## 2020-10-27 NOTE — NURSING NOTE
Patient presents to clinic for medication management.  Ani Schrader LPN ....................  10/27/2020   3:12 PM

## 2020-10-28 ASSESSMENT — ANXIETY QUESTIONNAIRES: GAD7 TOTAL SCORE: 12

## 2021-01-03 ENCOUNTER — HEALTH MAINTENANCE LETTER (OUTPATIENT)
Age: 31
End: 2021-01-03

## 2021-03-15 DIAGNOSIS — F41.9 ANXIETY: ICD-10-CM

## 2021-03-15 DIAGNOSIS — F32.1 MODERATE MAJOR DEPRESSION (H): ICD-10-CM

## 2021-03-15 DIAGNOSIS — F43.22 ADJUSTMENT DISORDER WITH ANXIOUS MOOD: ICD-10-CM

## 2021-03-15 RX ORDER — BUSPIRONE HYDROCHLORIDE 10 MG/1
10 TABLET ORAL 3 TIMES DAILY
Qty: 90 TABLET | Refills: 3 | OUTPATIENT
Start: 2021-03-15

## 2021-03-15 RX ORDER — CITALOPRAM HYDROBROMIDE 20 MG/1
20 TABLET ORAL DAILY
Qty: 90 TABLET | Refills: 3 | OUTPATIENT
Start: 2021-03-15

## 2021-03-15 NOTE — TELEPHONE ENCOUNTER
"[facility] sent Rx request for the following:   busPIRone (BUSPAR) 10 MG tablet  Sig: Take 1 tablet (10 mg) by mouth 3 times daily    Last Prescription Date:   10/27/2020  Last Fill Qty/Refills:         90, R-3    Atypical Antidepressants Protocol Failed - 3/15/2021 11:38 AM       Failed - Patient has PHQ-9 score less than 5 in past 6 months.    Please review last PHQ-9 score.          Passed - Medication active on med list       Passed - Patient is age 18 or older       Passed - No active pregnancy on record       Passed - No positive pregnancy test in past 12 mos       Passed - Recent (6 mo) or future (30 days) visit within the authorizing provider's specialty    Patient had office visit in the last 6 months or has a visit in the next 30 days with authorizing provider or within the authorizing provider's specialty.  See \"Patient Info\" tab in inbasket, or \"Choose Columns\" in Meds & Orders section of the refill encounter.         citalopram (CELEXA) 20 MG tablet  SigTake 1 tablet (20 mg) by mouth daily    Last Prescription Date:   10/27/2020  Last Fill Qty/Refills:         90, R-3    SSRIs Protocol Failed - 3/15/2021 11:38 AM       Failed - PHQ-9 score less than 5 in past 6 months    Please review last PHQ-9 score.          Passed - Medication is active on med list       Passed - Patient is age 18 or older       Passed - No active pregnancy on record       Passed - No positive pregnancy test in last 12 months       Passed - Recent (6 mo) or future (30 days) visit within the authorizing provider's specialty    Patient had office visit in the last 6 months or has a visit in the next 30 days with authorizing provider or within the authorizing provider's specialty.  See \"Patient Info\" tab in inbasket, or \"Choose Columns\" in Meds & Orders section of the refill encounter.           Last Office Visit:              10/27/2020 ([facility])   Future Office visit:           None noted    Redundant refill request refused: Too " soon:    Unable to complete prescription refill per RN Medication Refill Policy.................... Anayeli Scanlon RN ....................  3/15/2021   12:37 PM

## 2021-03-17 DIAGNOSIS — F41.9 ANXIETY: ICD-10-CM

## 2021-03-17 DIAGNOSIS — F43.22 ADJUSTMENT DISORDER WITH ANXIOUS MOOD: Primary | ICD-10-CM

## 2021-03-17 DIAGNOSIS — F32.1 MODERATE MAJOR DEPRESSION (H): ICD-10-CM

## 2021-03-17 RX ORDER — CITALOPRAM HYDROBROMIDE 20 MG/1
20 TABLET ORAL DAILY
Qty: 90 TABLET | Refills: 3 | Status: CANCELLED | OUTPATIENT
Start: 2021-03-17

## 2021-03-17 RX ORDER — BUSPIRONE HYDROCHLORIDE 10 MG/1
10 TABLET ORAL 3 TIMES DAILY
Qty: 270 TABLET | Refills: 1 | Status: SHIPPED | OUTPATIENT
Start: 2021-03-17 | End: 2021-09-20

## 2021-03-17 NOTE — TELEPHONE ENCOUNTER
"Karolina from [facility] called and states medication was refused as too soon on 3/15. Patient is currently waiting in pharmacy. Pt would have been out of medication around 2/27/21. Previously, [facility] refill protocol required Pt to be seen every 6 months for refills of this medication. Protocol updated as noted below.     [facility] sent Rx request for the following:      Requested Prescriptions   Pending Prescriptions Disp Refills     busPIRone (BUSPAR) 10 MG tablet [Pharmacy Med Name: buspirone 10 mg tablet] 90 tablet 3     Sig: Take 1 tablet (10 mg) by mouth 3 times daily       Atypical Antidepressants Protocol Passed - 3/17/2021  4:33 PM        Passed - Recent (12 mo) or future (30 days) visit within the authorizing provider's specialty     Patient has had an office visit with the authorizing provider or a provider within the authorizing providers department within the previous 12 mos or has a future within next 30 days. See \"Patient Info\" tab in inbasket, or \"Choose Columns\" in Meds & Orders section of the refill encounter.              Passed - Medication active on med list        Passed - Patient is age 18 or older        Passed - No active pregnancy on record        Passed - No positive pregnancy test in past 12 mos            Last Prescription Date:   10/27/20  Last Fill Qty/Refills:         90, R-3    Last Office Visit:              10/27/20   Future Office visit:           None    Prescription approved per [facility] Refill Protocol for 90 days and 1 refill. Karolina in [facility] notified of this information and informed that previously addressed (too soon) Celexa still requires Pt to be seen every 6 months, and Pt will be due in April. Karolina verbalized plan to notify Pt of this information. Ruth Clark RN .............. 3/17/2021  4:40 PM    "

## 2021-04-25 ENCOUNTER — HEALTH MAINTENANCE LETTER (OUTPATIENT)
Age: 31
End: 2021-04-25

## 2021-05-20 ENCOUNTER — VIRTUAL VISIT (OUTPATIENT)
Dept: FAMILY MEDICINE | Facility: OTHER | Age: 31
End: 2021-05-20
Attending: PHYSICIAN ASSISTANT
Payer: COMMERCIAL

## 2021-05-20 DIAGNOSIS — F41.9 ANXIETY: ICD-10-CM

## 2021-05-20 PROCEDURE — 99441 PR PHYSICIAN TELEPHONE EVALUATION 5-10 MIN: CPT | Performed by: PHYSICIAN ASSISTANT

## 2021-05-20 RX ORDER — ALPRAZOLAM 1 MG
TABLET ORAL
Qty: 45 TABLET | Refills: 3 | Status: SHIPPED | OUTPATIENT
Start: 2021-05-20 | End: 2021-09-14

## 2021-05-20 ASSESSMENT — ANXIETY QUESTIONNAIRES
2. NOT BEING ABLE TO STOP OR CONTROL WORRYING: MORE THAN HALF THE DAYS
1. FEELING NERVOUS, ANXIOUS, OR ON EDGE: SEVERAL DAYS
GAD7 TOTAL SCORE: 9
5. BEING SO RESTLESS THAT IT IS HARD TO SIT STILL: SEVERAL DAYS
6. BECOMING EASILY ANNOYED OR IRRITABLE: SEVERAL DAYS
7. FEELING AFRAID AS IF SOMETHING AWFUL MIGHT HAPPEN: SEVERAL DAYS
IF YOU CHECKED OFF ANY PROBLEMS ON THIS QUESTIONNAIRE, HOW DIFFICULT HAVE THESE PROBLEMS MADE IT FOR YOU TO DO YOUR WORK, TAKE CARE OF THINGS AT HOME, OR GET ALONG WITH OTHER PEOPLE: SOMEWHAT DIFFICULT
3. WORRYING TOO MUCH ABOUT DIFFERENT THINGS: MORE THAN HALF THE DAYS

## 2021-05-20 ASSESSMENT — PATIENT HEALTH QUESTIONNAIRE - PHQ9: 5. POOR APPETITE OR OVEREATING: SEVERAL DAYS

## 2021-05-20 NOTE — NURSING NOTE
Patient is requesting medication refills.  Ani Schrader LPN ....................  5/20/2021   2:45 PM

## 2021-05-20 NOTE — PROGRESS NOTES
Tray is a 31 year old who is being evaluated via a billable telephone visit.      What phone number would you like to be contacted at? [phone]  [facility] would [facility]    Assessment & Plan     1. Anxiety  Doing well with current medications. Three refills of Xanax given.  reviewed and appears appropriate. Reminded patient she is over due for an annual exam, pap smear screening. She will schedule this at her convenience.   - ALPRAZolam (XANAX) 1 MG tablet; Take 1/2 to 1 mg nightly at bedtime as needed. May take an additional 1/2 mg a few hours later if needed.  Dispense: 45 tablet; Refill: 3      No follow-ups on file.    Lakia Erazo PA-C  [facility] AND [facility]    Subjective   Tray is a 31 year old who presents for the following health issues     HPI   Contacted via telephone for medication refills. Requesting refills of Xanax for which she takes to help with anxiety and sleep. Taking one tablet every night at bedtime and often needing to take another half tablet a few hours later after waking. Continues on Celexa and Buspar as well. Feels stable with current medications and doses. Notes no side effects.     Patient is due for annual exam, pap smear screening,        PAST MEDICAL HISTORY:   Past Medical History:   Diagnosis Date     Personal history of other mental and behavioral disorders     No Comments Provided     Personal history of urinary infection     No Comments Provided       PAST SURGICAL HISTORY:   Past Surgical History:   Procedure Laterality Date     APPENDECTOMY OPEN      09/05,Open, [facility]     ESOPHAGOSCOPY, GASTROSCOPY, DUODENOSCOPY (EGD), COMBINED      02/23/2012,by Dr. Jama. bile reflux and gastropathy       FAMILY HISTORY:   Family History   Problem Relation Age of Onset     Diabetes Mother         Diabetes     Other - See Comments Mother         Psychiatric illness,depression       SOCIAL HISTORY:   Social History     Tobacco Use     Smoking  status: Current Every Day Smoker     Packs/day: 0.75     Types: Cigarettes     Smokeless tobacco: Never Used   Substance Use Topics     Alcohol use: Yes     Comment: Alcoholic Drinks/day: Rarely        Allergies   Allergen Reactions     Trazodone      Other reaction(s): Confusion  Gave her a hangover effect.     Amoxicillin Rash     Current Outpatient Medications   Medication     ALPRAZolam (XANAX) 1 MG tablet     busPIRone (BUSPAR) 10 MG tablet     citalopram (CELEXA) 20 MG tablet     No current facility-administered medications for this visit.          Review of Systems   Constitutional, HEENT, cardiovascular, pulmonary, gi and gu systems are negative, except as otherwise noted.      Objective           Vitals:  No vitals were obtained today due to virtual visit.    Physical Exam   healthy, alert and no distress  PSYCH: Alert and oriented times 3; coherent speech, normal   rate and volume, able to articulate logical thoughts, able   to abstract reason, no tangential thoughts, no hallucinations   or delusions  Her affect is normal  RESP: No cough, no audible wheezing, able to talk in full sentences  Remainder of exam unable to be completed due to telephone visits            Phone call duration: 5 minutes

## 2021-05-21 ASSESSMENT — ANXIETY QUESTIONNAIRES: GAD7 TOTAL SCORE: 9

## 2021-09-17 NOTE — PROGRESS NOTES
SUBJECTIVE:   CC: Tray Talavera is an 31 year old woman who presents for preventive health visit.       Patient has been advised of split billing requirements and indicates understanding: Yes  HPI  Here for annual physical.  She is due for Pap smear screening.  She declines obtaining lab work today.  She does not wish to finish hepatitis B vaccination.  Declines influenza vaccine.    Anxiety:  History of anxiety for which she is on Celexa and BuSpar.  Using Ativan as needed.  She feels she is doing well with all medications.  Does notice some room for improvement but is not wanting to change medications at this time.    Answers for HPI/ROS submitted by the patient on 9/20/2021  NATHAN 7 TOTAL SCORE: 9    Contraception: None  Risk for STI?: No  Last pap: 02/2014  Any hx of abnormal paps:  No  FH of early CA?: No  Cholesterol/DM concerns/screening: N/A  Tobacco?: Less than 1 ppd  Calcium intake: Dietary  DEXA: Not indicated due to patient age and health status  Last mammo: Not indicated due to patient age and health status  Colonoscopy: Not indicated due to patient age and health status  Immunizations: influenza, Hep B      Today's PHQ-2 Score:   PHQ-2 ( 1999 Pfizer) 9/20/2021   Q1: Little interest or pleasure in doing things 1   Q2: Feeling down, depressed or hopeless 1   PHQ-2 Score 2   Q1: Little interest or pleasure in doing things Several days   Q2: Feeling down, depressed or hopeless Several days   PHQ-2 Score 2       Have you ever done Advance Care Planning? (For example, a Health Directive, POLST, or a discussion with a medical provider or your loved ones about your wishes): No, advance care planning information given to patient to review.  Patient declined advance care planning discussion at this time.    Social History     Tobacco Use     Smoking status: Current Every Day Smoker     Packs/day: 0.75     Types: Cigarettes     Smokeless tobacco: Never Used   Substance Use Topics     Alcohol use: Yes      Comment: Alcoholic Drinks/day: Rarely       Reviewed orders with patient.  Reviewed health maintenance and updated orders accordingly - Yes      Breast Cancer Screening:  Any new diagnosis of family breast, ovarian, or bowel cancer? No    FHS-7: No flowsheet data found.    Patient under 40 years of age: Routine Mammogram Screening not recommended.   Pertinent mammograms are reviewed under the imaging tab.    History of abnormal Pap smear: NO - age 30-65 PAP every 5 years with negative HPV co-testing recommended     Reviewed and updated as needed this visit by clinical staff  Tobacco  Allergies  Meds     Fam Hx  Soc Hx        Reviewed and updated as needed this visit by Provider        Fam Hx         Past Medical History:   Diagnosis Date     Personal history of other mental and behavioral disorders     No Comments Provided     Personal history of urinary infection     No Comments Provided      Past Surgical History:   Procedure Laterality Date     APPENDECTOMY OPEN      09/05,Open, [facility]     ESOPHAGOSCOPY, GASTROSCOPY, DUODENOSCOPY (EGD), COMBINED      02/23/2012,by Dr. Jama. bile reflux and gastropathy     OB History   No obstetric history on file.       Review of Systems  CONSTITUTIONAL: NEGATIVE for fever, chills, change in weight  INTEGUMENTARU/SKIN: NEGATIVE for worrisome rashes, moles or lesions  EYES: NEGATIVE for vision changes or irritation  ENT: NEGATIVE for ear, mouth and throat problems  RESP: NEGATIVE for significant cough or SOB  BREAST: NEGATIVE for masses, tenderness or discharge  CV: NEGATIVE for chest pain, palpitations or peripheral edema  GI: NEGATIVE for nausea, abdominal pain, heartburn, or change in bowel habits  : NEGATIVE for unusual urinary or vaginal symptoms. Periods are regular.  MUSCULOSKELETAL: NEGATIVE for significant arthralgias or myalgia  NEURO: NEGATIVE for weakness, dizziness or paresthesias  PSYCHIATRIC: NEGATIVE for changes in mood or affect     OBJECTIVE:   /80    "Pulse 91   Temp 97.3  F (36.3  C)   Resp 14   Ht 1.664 m (5' 5.5\")   Wt 62.2 kg (137 lb 3.2 oz)   SpO2 100%   Breastfeeding No   BMI 22.48 kg/m    Physical Exam  GENERAL: healthy, alert and no distress  EYES: Eyes grossly normal to inspection, PERRL and conjunctivae and sclerae normal  HENT: ear canals and TM's normal, nose and mouth without ulcers or lesions  NECK: no adenopathy, no asymmetry, masses, or scars and thyroid normal to palpation  RESP: lungs clear to auscultation - no rales, rhonchi or wheezes  CV: regular rate and rhythm, normal S1 S2, no S3 or S4, no murmur, click or rub, no peripheral edema and peripheral pulses strong  Genitourinary Exam Female: External genitalia, vulva and vagina are without lesions, masses, or ulcerations. Speculum exam reveals normal cervix. Mild amount of white thin discharge. Pap smear obtained.    MS: no gross musculoskeletal defects noted, no edema  SKIN: no suspicious lesions or rashes  NEURO: Normal strength and tone, mentation intact and speech normal  PSYCH: mentation appears normal, affect normal/bright    Diagnostic Test Results:  none     ASSESSMENT/PLAN:       ICD-10-CM    1. Routine general medical examination at a health care facility  Z00.00    2. Cervical cancer screening  Z12.4 Pap Screen Thin Prep     HPV High Risk Types DNA Cervical   3. Adjustment disorder with anxious mood  F43.22 busPIRone (BUSPAR) 10 MG tablet   4. Moderate major depression (H)  F32.1 citalopram (CELEXA) 20 MG tablet   5. Anxiety  F41.9 citalopram (CELEXA) 20 MG tablet     ALPRAZolam (XANAX) 1 MG tablet     1.  Up-to-date on screenings.  Declines finishing hepatitis B vaccination series.  Declines influenza vaccine.  Declines lab work today.  Follow-up in 1 year or sooner if needed.    2.  Pap smear obtained.  Results pending, will notify with results.  Encourage safe sex practices.    3, 4, 5.  Doing okay.  Does not want to change medications at this time.  Refilled medications as " "above.  Follow-up as needed.   reviewed and appears appropriate.      Patient has been advised of split billing requirements and indicates understanding: Yes  COUNSELING:  Reviewed preventive health counseling, as reflected in patient instructions    Estimated body mass index is 22.48 kg/m  as calculated from the following:    Height as of this encounter: 1.664 m (5' 5.5\").    Weight as of this encounter: 62.2 kg (137 lb 3.2 oz).        She reports that she has been smoking cigarettes. She has been smoking about 0.75 packs per day. She has never used smokeless tobacco.  Tobacco Cessation Action Plan:   Information offered: Patient not interested at this time      Counseling Resources:  ATP IV Guidelines  Pooled Cohorts Equation Calculator  Breast Cancer Risk Calculator  BRCA-Related Cancer Risk Assessment: FHS-7 Tool  FRAX Risk Assessment  ICSI Preventive Guidelines  Dietary Guidelines for Americans, 2010  USDA's MyPlate  ASA Prophylaxis  Lung CA Screening    Lakia Erazo PA-C  [facility]  "

## 2021-09-20 ENCOUNTER — OFFICE VISIT (OUTPATIENT)
Dept: FAMILY MEDICINE | Facility: OTHER | Age: 31
End: 2021-09-20
Attending: PHYSICIAN ASSISTANT
Payer: COMMERCIAL

## 2021-09-20 VITALS
SYSTOLIC BLOOD PRESSURE: 138 MMHG | HEIGHT: 66 IN | BODY MASS INDEX: 22.05 KG/M2 | HEART RATE: 91 BPM | OXYGEN SATURATION: 100 % | RESPIRATION RATE: 14 BRPM | DIASTOLIC BLOOD PRESSURE: 80 MMHG | TEMPERATURE: 97.3 F | WEIGHT: 137.2 LBS

## 2021-09-20 DIAGNOSIS — F41.9 ANXIETY: ICD-10-CM

## 2021-09-20 DIAGNOSIS — Z12.4 CERVICAL CANCER SCREENING: ICD-10-CM

## 2021-09-20 DIAGNOSIS — Z00.00 ROUTINE GENERAL MEDICAL EXAMINATION AT A HEALTH CARE FACILITY: Primary | ICD-10-CM

## 2021-09-20 DIAGNOSIS — F43.22 ADJUSTMENT DISORDER WITH ANXIOUS MOOD: ICD-10-CM

## 2021-09-20 DIAGNOSIS — F32.1 MODERATE MAJOR DEPRESSION (H): ICD-10-CM

## 2021-09-20 PROCEDURE — 99395 PREV VISIT EST AGE 18-39: CPT | Performed by: PHYSICIAN ASSISTANT

## 2021-09-20 PROCEDURE — 87624 HPV HI-RISK TYP POOLED RSLT: CPT | Mod: ZL | Performed by: PHYSICIAN ASSISTANT

## 2021-09-20 PROCEDURE — G0123 SCREEN CERV/VAG THIN LAYER: HCPCS | Performed by: PHYSICIAN ASSISTANT

## 2021-09-20 RX ORDER — CITALOPRAM HYDROBROMIDE 20 MG/1
20 TABLET ORAL DAILY
Qty: 90 TABLET | Refills: 3 | Status: SHIPPED | OUTPATIENT
Start: 2021-09-20 | End: 2022-09-16

## 2021-09-20 RX ORDER — BUSPIRONE HYDROCHLORIDE 10 MG/1
10 TABLET ORAL 3 TIMES DAILY
Qty: 270 TABLET | Refills: 3 | Status: SHIPPED | OUTPATIENT
Start: 2021-09-20 | End: 2022-09-16

## 2021-09-20 RX ORDER — ALPRAZOLAM 1 MG
TABLET ORAL
Qty: 30 TABLET | Refills: 0 | Status: SHIPPED | OUTPATIENT
Start: 2021-09-20 | End: 2021-10-12

## 2021-09-20 ASSESSMENT — ANXIETY QUESTIONNAIRES
GAD7 TOTAL SCORE: 9
7. FEELING AFRAID AS IF SOMETHING AWFUL MIGHT HAPPEN: NOT AT ALL
4. TROUBLE RELAXING: MORE THAN HALF THE DAYS
2. NOT BEING ABLE TO STOP OR CONTROL WORRYING: MORE THAN HALF THE DAYS
3. WORRYING TOO MUCH ABOUT DIFFERENT THINGS: MORE THAN HALF THE DAYS
6. BECOMING EASILY ANNOYED OR IRRITABLE: SEVERAL DAYS
GAD7 TOTAL SCORE: 9
8. IF YOU CHECKED OFF ANY PROBLEMS, HOW DIFFICULT HAVE THESE MADE IT FOR YOU TO DO YOUR WORK, TAKE CARE OF THINGS AT HOME, OR GET ALONG WITH OTHER PEOPLE?: SOMEWHAT DIFFICULT
GAD7 TOTAL SCORE: 9
1. FEELING NERVOUS, ANXIOUS, OR ON EDGE: SEVERAL DAYS
5. BEING SO RESTLESS THAT IT IS HARD TO SIT STILL: SEVERAL DAYS
7. FEELING AFRAID AS IF SOMETHING AWFUL MIGHT HAPPEN: NOT AT ALL

## 2021-09-20 ASSESSMENT — PAIN SCALES - GENERAL: PAINLEVEL: NO PAIN (0)

## 2021-09-20 ASSESSMENT — MIFFLIN-ST. JEOR: SCORE: 1336.15

## 2021-09-20 ASSESSMENT — PATIENT HEALTH QUESTIONNAIRE - PHQ9: SUM OF ALL RESPONSES TO PHQ QUESTIONS 1-9: 9

## 2021-09-20 NOTE — NURSING NOTE
Patient presents to clinic for annual physical.  Ani Schrader LPN ....................  9/20/2021   3:20 PM

## 2021-09-21 ENCOUNTER — MYC MEDICAL ADVICE (OUTPATIENT)
Dept: FAMILY MEDICINE | Facility: OTHER | Age: 31
End: 2021-09-21

## 2021-09-21 ASSESSMENT — ANXIETY QUESTIONNAIRES: GAD7 TOTAL SCORE: 9

## 2021-09-22 LAB
BKR LAB AP GYN ADEQUACY: NORMAL
BKR LAB AP GYN INTERPRETATION: NORMAL
BKR LAB AP HPV REFLEX: NO
BKR LAB AP PREVIOUS ABNORMAL: NORMAL
PATH REPORT.RELEVANT HX SPEC: NORMAL

## 2021-10-05 LAB
HUMAN PAPILLOMA VIRUS 16 DNA: NEGATIVE
HUMAN PAPILLOMA VIRUS 18 DNA: NEGATIVE
HUMAN PAPILLOMA VIRUS FINAL DIAGNOSIS: NORMAL
HUMAN PAPILLOMA VIRUS OTHER HR: NEGATIVE

## 2021-10-10 ENCOUNTER — MYC REFILL (OUTPATIENT)
Dept: FAMILY MEDICINE | Facility: OTHER | Age: 31
End: 2021-10-10

## 2021-10-10 DIAGNOSIS — F41.9 ANXIETY: ICD-10-CM

## 2021-10-10 RX ORDER — ALPRAZOLAM 1 MG
TABLET ORAL
Qty: 30 TABLET | Refills: 0 | Status: CANCELLED | OUTPATIENT
Start: 2021-10-10

## 2021-10-11 DIAGNOSIS — F41.9 ANXIETY: ICD-10-CM

## 2021-10-12 RX ORDER — ALPRAZOLAM 1 MG
TABLET ORAL
Qty: 30 TABLET | Refills: 0 | Status: SHIPPED | OUTPATIENT
Start: 2021-10-12 | End: 2021-11-05

## 2021-10-12 NOTE — TELEPHONE ENCOUNTER
[facility] sent Rx request for the following:   ALPRAZolam (XANAX) 1 MG tablet   SigTake 1/2 TO 1 mg nightly at BEDTIME as needed. May take an additional 1/2 mg a few hours later if needed.    Last Prescription Date:   09/20/2021  Last Fill Qty/Refills:         30, R-0    Last Office Visit:              09/20/2021 ([facility])   Future Office visit:           None noted    Routing refill request to provider for review/approval because:  Drug not on the [facility], P or [facility] refill protocol or controlled substance    Unable to complete prescription refill per RN Medication Refill Policy.................... Anayeli Queen RN ....................  10/12/2021   2:54 PM

## 2021-11-03 DIAGNOSIS — F41.9 ANXIETY: ICD-10-CM

## 2021-11-04 NOTE — TELEPHONE ENCOUNTER
[facility] sent Rx request for the following:      Requested Prescriptions   Pending Prescriptions Disp Refills     ALPRAZolam (XANAX) 1 MG tablet [Pharmacy Med Name: alprazolam 1 mg tablet] 30 tablet 0     Sig: Take 1/2 TO 1 mg nightly at BEDTIME as needed. May take an additional 1/2 mg a few hours later if needed.   Last Prescription Date:   10/12/21  Last Fill Qty/Refills:         30, R-0    Last Office Visit:              9/20/21   Future Office visit:           None  Routing refill request to provider for review/approval because:  Drug not on the [facility], P or [facility] refill protocol or controlled substance    Unable to complete prescription refill per RN Medication Refill Policy. Ruth Clark RN .............. 11/4/2021  4:05 PM

## 2021-11-05 RX ORDER — ALPRAZOLAM 1 MG
TABLET ORAL
Qty: 30 TABLET | Refills: 0 | Status: SHIPPED | OUTPATIENT
Start: 2021-11-05 | End: 2021-11-24

## 2021-11-24 DIAGNOSIS — F41.9 ANXIETY: ICD-10-CM

## 2021-11-24 RX ORDER — ALPRAZOLAM 1 MG
TABLET ORAL
Qty: 45 TABLET | Refills: 3 | Status: SHIPPED | OUTPATIENT
Start: 2021-11-24 | End: 2022-03-23

## 2021-11-24 NOTE — TELEPHONE ENCOUNTER
[facility] sent Rx request for the following:      Requested Prescriptions   Pending Prescriptions Disp Refills     ALPRAZolam (XANAX) 1 MG tablet [Pharmacy Med Name: alprazolam 1 mg tablet] 30 tablet 0     Sig: Take 1/2 TO 1 mg nightly at BEDTIME as needed. May take an additional 1/2 mg a few hours later if needed.   Last Prescription Date:   11/5/21  Last Fill Qty/Refills:         30, R-0    Last Office Visit:              9/20/21   Future Office visit:           None  Routing refill request to provider for review/approval because:  Drug not on the [facility], P or [facility] refill protocol or controlled substance    Unable to complete prescription refill per RN Medication Refill Policy. Ruth Clark RN .............. 11/24/2021  10:30 AM

## 2022-03-21 DIAGNOSIS — F41.9 ANXIETY: ICD-10-CM

## 2022-03-22 RX ORDER — ALPRAZOLAM 1 MG
TABLET ORAL
Qty: 45 TABLET | Refills: 3 | OUTPATIENT
Start: 2022-03-22

## 2022-03-22 NOTE — TELEPHONE ENCOUNTER
[facility] sent Rx request for the following:      Requested Prescriptions   Pending Prescriptions Disp Refills     ALPRAZolam (XANAX) 1 MG tablet [Pharmacy Med Name: alprazolam 1 mg tablet] 45 tablet 3     Sig: Take 1/2 TO 1 mg nightly at BEDTIME as needed. May take an additional 1/2 mg a few hours later if needed.   Last Prescription Date:   11/24/24  Last Fill Qty/Refills:         45, R-3    Last Office Visit:              9/20/21   Future Office visit:           None    Per GI clinic policy, Pt must be seen every 6 months, to discuss refills of this controlled medication. Refused, with note to pharmacy. Ruth Clark RN .............. 3/22/2022  2:16 PM

## 2022-03-23 ENCOUNTER — MYC MEDICAL ADVICE (OUTPATIENT)
Dept: FAMILY MEDICINE | Facility: OTHER | Age: 32
End: 2022-03-23
Payer: COMMERCIAL

## 2022-03-23 DIAGNOSIS — F41.9 ANXIETY: ICD-10-CM

## 2022-03-23 RX ORDER — ALPRAZOLAM 1 MG
TABLET ORAL
Qty: 7 TABLET | Refills: 0 | Status: SHIPPED | OUTPATIENT
Start: 2022-03-23 | End: 2023-02-07

## 2022-03-23 RX ORDER — ALPRAZOLAM 1 MG
TABLET ORAL
Qty: 7 TABLET | Refills: 0 | Status: CANCELLED | OUTPATIENT
Start: 2022-03-23

## 2022-04-10 ASSESSMENT — PATIENT HEALTH QUESTIONNAIRE - PHQ9
SUM OF ALL RESPONSES TO PHQ QUESTIONS 1-9: 11
10. IF YOU CHECKED OFF ANY PROBLEMS, HOW DIFFICULT HAVE THESE PROBLEMS MADE IT FOR YOU TO DO YOUR WORK, TAKE CARE OF THINGS AT HOME, OR GET ALONG WITH OTHER PEOPLE: VERY DIFFICULT
SUM OF ALL RESPONSES TO PHQ QUESTIONS 1-9: 11

## 2022-04-11 ASSESSMENT — PATIENT HEALTH QUESTIONNAIRE - PHQ9
SUM OF ALL RESPONSES TO PHQ QUESTIONS 1-9: 11
10. IF YOU CHECKED OFF ANY PROBLEMS, HOW DIFFICULT HAVE THESE PROBLEMS MADE IT FOR YOU TO DO YOUR WORK, TAKE CARE OF THINGS AT HOME, OR GET ALONG WITH OTHER PEOPLE: VERY DIFFICULT

## 2022-04-11 NOTE — PROGRESS NOTES
Assessment & Plan     1. Substance abuse (H)  Patient today reporting a longstanding history of on and off substance abuse over the past many years.  Currently has been abusing Xanax and Adderall purchased off the street.  Reports a history of addiction to narcotic medications.  Patient is interested in chemical dependency treatment at this time.  She is interested in inpatient treatment through [facility].  She reports that she will contact [facility] to get set up for treatment plan.  She does have paperwork to be completed for her employer however this is requiring specific details regarding her treatment plan and I do not have this as of yet.  Recommend patient return with paperwork to be completed once she is set up for treatment or have mental health provider complete this while she is in chemical dependency treatment.    2. Irregular menses  Differential includes pregnancy, related to physical and emotional stress with substance abuse, vaginal infection, STD, thyroid disorder, hormonal imbalance, etc.  Urine pregnancy test pending, will notify with results.  Offered additional evaluation but patient reports she would like to hold off for now and will follow up if menses continue to be regular.  STD testing not completed as patient denies concerns.  - Pregnancy, Urine (HCG)      Return if symptoms worsen or fail to improve.    Lakia Erazo PA-C  [facility] is a 32 year old who presents for the following health issues  accompanied by her partner.    History of Present Illness       Mental Health Follow-up:                    Today's PHQ-9         PHQ-9 Total Score: 11  PHQ-9 Q9 Thoughts of better off dead/self-harm past 2 weeks :   (P) Not at all    How difficult have these problems made it for you to do your work, take care of things at home, or get along with other people: Very difficult        Reason for visit:  Possible  pregnancy/bleeding  Symptom onset:  1-3 days ago  Symptoms include:  Abnormal bleeding that s also quite early, mild cramping  Symptom intensity:  Moderate  Symptom progression:  Worsening  Had these symptoms before:  No  What makes it better:  Resting    She eats 0-1 servings of fruits and vegetables daily.She consumes 1 sweetened beverage(s) daily.She exercises with enough effort to increase her heart rate 60 or more minutes per day.  She exercises with enough effort to increase her heart rate 5 days per week.   She is taking medications regularly.     Here for discussion regarding multiple concerns.  Patient reports that she recently was let go from her place of employment, [employer].  She reports she was let go due to mental health concerns.  Patient had previously been managed by myself with Xanax.  Patient is reporting today that she has substance abuse.  She had been abusing the Xanax, taking it more frequently and higher doses than it was prescribed.  Patient also reports that she has been purchasing Adderall and overdosing on this as well.  Reports a history of addiction to pain medications as well.  Reports she has been struggling with this on and off for many years.  Patient reports she has never had help regarding her substance abuse but her work is willing to give her a second chance if she is willing to receive help.  Patient initially was not on board with this but is now wanting to try chemical dependency treatment.  She has paperwork to be completed but she is unsure if her PCP or mental health to be completing this.  She has not yet set up chemical dependency treatment inpatient or outpatient.  She was not sure where to go with this.    Patient also reports menstrual irregularity.  Her menstrual cycle is usually fairly regular.  She is due for her period on the 18th the 21st of this month but reports over the weekend she noticed she began bleeding.  She is having bright red bleeding that lasted a few  "days and is now more of just a spotting.  She did have some lower suprapubic cramping the first day.  She does report that she is sexually active with one male partner.  They have not been using condoms so possibility of pregnancy.  Denies STD concerns.  No associated fever/chills, vaginal itching, burning, discharge, urinary symptoms, nausea, vomiting, diarrhea, constipation.    PAST MEDICAL HISTORY:   Past Medical History:   Diagnosis Date     Personal history of other mental and behavioral disorders     No Comments Provided     Personal history of urinary infection     No Comments Provided       PAST SURGICAL HISTORY:   Past Surgical History:   Procedure Laterality Date     APPENDECTOMY OPEN      09/05,Open, [facility]     ESOPHAGOSCOPY, GASTROSCOPY, DUODENOSCOPY (EGD), COMBINED      02/23/2012,by Dr. Jama. bile reflux and gastropathy       FAMILY HISTORY:   Family History   Problem Relation Age of Onset     Diabetes Mother         Diabetes     Other - See Comments Mother         Psychiatric illness,depression     Myocardial Infarction Father        SOCIAL HISTORY:   Social History     Tobacco Use     Smoking status: Current Every Day Smoker     Packs/day: 0.75     Types: Cigarettes     Smokeless tobacco: Never Used   Substance Use Topics     Alcohol use: Yes     Comment: Alcoholic Drinks/day: Rarely        Allergies   Allergen Reactions     Trazodone      Other reaction(s): Confusion  Gave her a hangover effect.     Amoxicillin Rash     Current Outpatient Medications   Medication     busPIRone (BUSPAR) 10 MG tablet     citalopram (CELEXA) 20 MG tablet     ALPRAZolam (XANAX) 1 MG tablet     No current facility-administered medications for this visit.         Review of Systems   Per HPI        Objective    /74   Pulse 91   Temp (!) 96.4  F (35.8  C)   Resp 14   Ht 1.664 m (5' 5.5\")   Wt 58.7 kg (129 lb 6.4 oz)   LMP 03/12/2022   SpO2 100%   Breastfeeding No   BMI 21.21 kg/m    Body mass index is 21.21 " kg/m .  Physical Exam   General: Pleasant, in no apparent distress.  Cardiovascular: Regular rate and rhythm with S1 equal to S2. No murmurs, friction rubs, or gallops.   Respiratory: Lungs are resonant and clear to auscultation bilaterally. No wheezes, crackles, or rhonchi.  Psych: Appropriate mood and affect.

## 2022-04-12 ENCOUNTER — OFFICE VISIT (OUTPATIENT)
Dept: FAMILY MEDICINE | Facility: OTHER | Age: 32
End: 2022-04-12
Attending: PHYSICIAN ASSISTANT
Payer: COMMERCIAL

## 2022-04-12 VITALS
HEIGHT: 66 IN | BODY MASS INDEX: 20.79 KG/M2 | OXYGEN SATURATION: 100 % | WEIGHT: 129.4 LBS | SYSTOLIC BLOOD PRESSURE: 126 MMHG | RESPIRATION RATE: 14 BRPM | DIASTOLIC BLOOD PRESSURE: 74 MMHG | TEMPERATURE: 96.4 F | HEART RATE: 91 BPM

## 2022-04-12 DIAGNOSIS — F19.10 SUBSTANCE ABUSE (H): Primary | ICD-10-CM

## 2022-04-12 DIAGNOSIS — N92.6 IRREGULAR MENSES: ICD-10-CM

## 2022-04-12 LAB — HCG UR QL: NEGATIVE

## 2022-04-12 PROCEDURE — 99213 OFFICE O/P EST LOW 20 MIN: CPT | Performed by: PHYSICIAN ASSISTANT

## 2022-04-12 PROCEDURE — G0463 HOSPITAL OUTPT CLINIC VISIT: HCPCS

## 2022-04-12 PROCEDURE — 81025 URINE PREGNANCY TEST: CPT | Mod: ZL | Performed by: PHYSICIAN ASSISTANT

## 2022-04-12 ASSESSMENT — PAIN SCALES - GENERAL: PAINLEVEL: NO PAIN (1)

## 2022-04-12 NOTE — NURSING NOTE
Patient presents to clinic with paperwork about [facility]. She has admitted to abusing adderal and xanax and is going for treatment. Last menstrual cycle was not normal and would like pregnancy test.  Ani Schrader LPN ....................  4/12/2022   10:23 AM

## 2022-09-15 DIAGNOSIS — F41.9 ANXIETY: ICD-10-CM

## 2022-09-15 DIAGNOSIS — F43.22 ADJUSTMENT DISORDER WITH ANXIOUS MOOD: ICD-10-CM

## 2022-09-15 DIAGNOSIS — F32.1 MODERATE MAJOR DEPRESSION (H): ICD-10-CM

## 2022-09-16 RX ORDER — CITALOPRAM HYDROBROMIDE 20 MG/1
20 TABLET ORAL DAILY
Qty: 90 TABLET | Refills: 1 | Status: SHIPPED | OUTPATIENT
Start: 2022-09-16 | End: 2023-03-07 | Stop reason: ALTCHOICE

## 2022-09-16 RX ORDER — BUSPIRONE HYDROCHLORIDE 10 MG/1
10 TABLET ORAL 3 TIMES DAILY
Qty: 270 TABLET | Refills: 1 | Status: SHIPPED | OUTPATIENT
Start: 2022-09-16 | End: 2023-03-23

## 2022-09-16 NOTE — TELEPHONE ENCOUNTER
Routing refill request to provider for review/approval because:    LOV; 4/12/22  My chart message sent to patient to complete PHQ 9 questionnaire.    Shaniqua Patel RN on 9/16/2022 at 11:43 AM

## 2022-11-21 NOTE — LETTER
January 11, 2021      Tray Talavera  [address]   [place] [address]        Dear Tray,     A refill of citalopram (CELEXA) 40 MG tablet has been requested by your pharmacy and it appears you are a former patient of Misbah Hunt MD.  As Dr. Hunt is no longer with the clinic, we ask that you please contact [facility] for assistance in scheduling a medication management appointment with another one of our providers.    The refill request for this medication can then be discussed and addressed accordingly with your new primary care physician.  Your health is very important to us.  Please call the clinic at [phone] to schedule your appointment.    A limited 90 day manoj refill has been sent to your pharmacy to allow time to schedule your appointment.    If you are no longer using [facility] for your healthcare needs, please call to let us know. Doing so will remove you from our call/contact list.    Thank you for choosing [facility] and [facility] for your health care needs.    Sincerely,    Refill RN  [facility]               normal/clear to auscultation bilaterally/no wheezes/no rales/no rhonchi/no respiratory distress/airway patent/breath sounds equal/respirations non-labored

## 2023-01-28 ENCOUNTER — HEALTH MAINTENANCE LETTER (OUTPATIENT)
Age: 33
End: 2023-01-28

## 2023-02-06 NOTE — PROGRESS NOTES
Assessment & Plan     1. Moderate major depression (H)  2. Anxiety  Chronic, not well controlled. Will increase Celexa dose to 40 mg once daily.  Continue with BuSpar 10 mg 3 times daily.  Consider establishing with mental health medication manager in addition to therapist.  Follow-up in 4 weeks for recheck or sooner if needed.  - citalopram (CELEXA) 40 MG tablet; Take 1 tablet (40 mg) by mouth daily  Dispense: 30 tablet; Refill: 1    3. Sleep disorder  Chronic, not well controlled.  Previously tolerated trazodone other than some grogginess in the morning.  She would like to retry this.  Low-dose 50 mg as needed at bedtime as below.  Follow-up as needed.  - traZODone (DESYREL) 50 MG tablet; Take 1 tablet (50 mg) by mouth At Bedtime  Dispense: 30 tablet; Refill: 0      Return in about 4 weeks (around 3/7/2023), or if symptoms worsen or fail to improve.    Lakia Erazo PA-C  [facility] AND [facility] is a 32 year old presenting for the following health issues:  Refill Request      Healthy Habits:     Getting at least 3 servings of Calcium per day:  NO    Bi-annual eye exam:  Yes    Dental care twice a year:  NO    Sleep apnea or symptoms of sleep apnea:  Daytime drowsiness    Diet:  Regular (no restrictions)    Frequency of exercise:  None    Taking medications regularly:  Yes    Medication side effects:  None    PHQ-2 Total Score: 3    Additional concerns today:  Yes     Here for follow-up on anxiety depression.  Longstanding history of both for which she has been on several medications.  Most recently she has been on Celexa 20 mg daily along with BuSpar 10 mg 3 times daily.  Has been on Zoloft in her middle school years, transition to Prozac, Effexor as well with minimal improvements.  She had been on the Celexa at current dose for many years but more recently is struggling with increased anxiety.  She is struggle with racing thoughts, over thinking, over analyzing,  irritability.  She is also struggling with difficulty staying asleep.  She has tried over-the-counter medications, melatonin which she reports makes her nightmares worse.  She has tried trazodone previously which caused some grogginess which was listed as an allergy in her chart.  She did not have any other allergic reaction or other side effects.  She is interested in retrying this.  She does follow with a therapist regularly.    History of substance abuse.  She did complete chemical dependency treatment regarding this in spring 2022.  She has been sober since April 2022.    Answers for HPI/ROS submitted by the patient on 2/7/2023  If you checked off any problems, how difficult have these problems made it for you to do your work, take care of things at home, or get along with other people?: Very difficult  PHQ9 TOTAL SCORE: 12      PAST MEDICAL HISTORY:   Past Medical History:   Diagnosis Date     Personal history of other mental and behavioral disorders     No Comments Provided     Personal history of urinary infection     No Comments Provided       PAST SURGICAL HISTORY:   Past Surgical History:   Procedure Laterality Date     APPENDECTOMY OPEN      09/05,Open, [facility]     ESOPHAGOSCOPY, GASTROSCOPY, DUODENOSCOPY (EGD), COMBINED      02/23/2012,by Dr. Jama. bile reflux and gastropathy       FAMILY HISTORY:   Family History   Problem Relation Age of Onset     Diabetes Mother         Diabetes     Other - See Comments Mother         Psychiatric illness,depression     Myocardial Infarction Father        SOCIAL HISTORY:   Social History     Tobacco Use     Smoking status: Every Day     Packs/day: 0.75     Types: Cigarettes     Smokeless tobacco: Never   Substance Use Topics     Alcohol use: Yes     Comment: Alcoholic Drinks/day: Rarely        Allergies   Allergen Reactions     Trazodone      Other reaction(s): Confusion  Gave her a hangover effect.     Amoxicillin Rash     Current Outpatient Medications   Medication      "busPIRone (BUSPAR) 10 MG tablet     citalopram (CELEXA) 20 MG tablet     citalopram (CELEXA) 40 MG tablet     traZODone (DESYREL) 50 MG tablet     No current facility-administered medications for this visit.         Review of Systems   Per HPI        Objective    /78   Pulse 78   Temp 96.8  F (36  C)   Resp 14   Ht 1.664 m (5' 5.5\")   Wt 77.9 kg (171 lb 12.8 oz)   LMP 01/18/2023 (Exact Date)   SpO2 99%   BMI 28.15 kg/m    Body mass index is 28.15 kg/m .  Physical Exam   General: Pleasant, in no apparent distress.  Psych: Appropriate mood and affect.      "

## 2023-02-07 ENCOUNTER — OFFICE VISIT (OUTPATIENT)
Dept: FAMILY MEDICINE | Facility: OTHER | Age: 33
End: 2023-02-07
Attending: PHYSICIAN ASSISTANT
Payer: COMMERCIAL

## 2023-02-07 VITALS
OXYGEN SATURATION: 99 % | BODY MASS INDEX: 27.61 KG/M2 | RESPIRATION RATE: 14 BRPM | TEMPERATURE: 96.8 F | HEIGHT: 66 IN | WEIGHT: 171.8 LBS | DIASTOLIC BLOOD PRESSURE: 78 MMHG | HEART RATE: 78 BPM | SYSTOLIC BLOOD PRESSURE: 132 MMHG

## 2023-02-07 DIAGNOSIS — F41.9 ANXIETY: ICD-10-CM

## 2023-02-07 DIAGNOSIS — F32.1 MODERATE MAJOR DEPRESSION (H): Primary | ICD-10-CM

## 2023-02-07 DIAGNOSIS — G47.9 SLEEP DISORDER: ICD-10-CM

## 2023-02-07 PROBLEM — Z56.6 STRESS AT WORK: Status: RESOLVED | Noted: 2017-12-12 | Resolved: 2023-02-07

## 2023-02-07 PROCEDURE — G0463 HOSPITAL OUTPT CLINIC VISIT: HCPCS

## 2023-02-07 PROCEDURE — 99214 OFFICE O/P EST MOD 30 MIN: CPT | Performed by: PHYSICIAN ASSISTANT

## 2023-02-07 RX ORDER — CITALOPRAM HYDROBROMIDE 40 MG/1
40 TABLET ORAL DAILY
Qty: 30 TABLET | Refills: 1 | Status: SHIPPED | OUTPATIENT
Start: 2023-02-07 | End: 2023-04-07

## 2023-02-07 RX ORDER — TRAZODONE HYDROCHLORIDE 50 MG/1
50 TABLET, FILM COATED ORAL AT BEDTIME
Qty: 30 TABLET | Refills: 0 | Status: SHIPPED | OUTPATIENT
Start: 2023-02-07 | End: 2023-03-09

## 2023-02-07 ASSESSMENT — ENCOUNTER SYMPTOMS
PALPITATIONS: 0
COUGH: 0
HEADACHES: 0
CHILLS: 0
FEVER: 0
MYALGIAS: 0
SHORTNESS OF BREATH: 0
EYE PAIN: 0
DIARRHEA: 0
NAUSEA: 0
CONSTIPATION: 0
DYSURIA: 0
ABDOMINAL PAIN: 0
HEARTBURN: 0
NERVOUS/ANXIOUS: 0
PARESTHESIAS: 0
HEMATURIA: 0
BREAST MASS: 0
DIZZINESS: 0
HEMATOCHEZIA: 0
ARTHRALGIAS: 0
FREQUENCY: 0
JOINT SWELLING: 0
WEAKNESS: 0
SORE THROAT: 0

## 2023-02-07 ASSESSMENT — PAIN SCALES - GENERAL: PAINLEVEL: MILD PAIN (2)

## 2023-02-07 ASSESSMENT — PATIENT HEALTH QUESTIONNAIRE - PHQ9
10. IF YOU CHECKED OFF ANY PROBLEMS, HOW DIFFICULT HAVE THESE PROBLEMS MADE IT FOR YOU TO DO YOUR WORK, TAKE CARE OF THINGS AT HOME, OR GET ALONG WITH OTHER PEOPLE: VERY DIFFICULT
SUM OF ALL RESPONSES TO PHQ QUESTIONS 1-9: 12
SUM OF ALL RESPONSES TO PHQ QUESTIONS 1-9: 12

## 2023-02-07 NOTE — NURSING NOTE
Patient presents to clinic wanting to discuss a possible medication change as she feels her medications are not working. She does see mental [facility]  Ani Schrader LPN ....................  2/7/2023   10:23 AM

## 2023-03-06 DIAGNOSIS — G47.9 SLEEP DISORDER: ICD-10-CM

## 2023-03-07 ENCOUNTER — MYC MEDICAL ADVICE (OUTPATIENT)
Dept: INTERNAL MEDICINE | Facility: OTHER | Age: 33
End: 2023-03-07

## 2023-03-07 ENCOUNTER — OFFICE VISIT (OUTPATIENT)
Dept: INTERNAL MEDICINE | Facility: OTHER | Age: 33
End: 2023-03-07
Payer: COMMERCIAL

## 2023-03-07 VITALS
DIASTOLIC BLOOD PRESSURE: 72 MMHG | RESPIRATION RATE: 20 BRPM | SYSTOLIC BLOOD PRESSURE: 130 MMHG | TEMPERATURE: 98.7 F | OXYGEN SATURATION: 97 % | BODY MASS INDEX: 28.11 KG/M2 | WEIGHT: 171.5 LBS | HEART RATE: 69 BPM

## 2023-03-07 DIAGNOSIS — N76.0 BACTERIAL VAGINOSIS: Primary | ICD-10-CM

## 2023-03-07 DIAGNOSIS — B96.89 BACTERIAL VAGINOSIS: Primary | ICD-10-CM

## 2023-03-07 DIAGNOSIS — N92.6 IRREGULAR PERIODS: ICD-10-CM

## 2023-03-07 LAB
BASOPHILS # BLD AUTO: 0.1 10E3/UL (ref 0–0.2)
BASOPHILS NFR BLD AUTO: 1 %
CLUE CELLS: PRESENT
EOSINOPHIL # BLD AUTO: 0.1 10E3/UL (ref 0–0.7)
EOSINOPHIL NFR BLD AUTO: 1 %
ERYTHROCYTE [DISTWIDTH] IN BLOOD BY AUTOMATED COUNT: 12.1 % (ref 10–15)
HCT VFR BLD AUTO: 39.9 % (ref 35–47)
HGB BLD-MCNC: 13.5 G/DL (ref 11.7–15.7)
IMM GRANULOCYTES # BLD: 0 10E3/UL
IMM GRANULOCYTES NFR BLD: 0 %
LYMPHOCYTES # BLD AUTO: 3.6 10E3/UL (ref 0.8–5.3)
LYMPHOCYTES NFR BLD AUTO: 29 %
MCH RBC QN AUTO: 32.3 PG (ref 26.5–33)
MCHC RBC AUTO-ENTMCNC: 33.8 G/DL (ref 31.5–36.5)
MCV RBC AUTO: 96 FL (ref 78–100)
MONOCYTES # BLD AUTO: 0.7 10E3/UL (ref 0–1.3)
MONOCYTES NFR BLD AUTO: 6 %
NEUTROPHILS # BLD AUTO: 7.9 10E3/UL (ref 1.6–8.3)
NEUTROPHILS NFR BLD AUTO: 63 %
NRBC # BLD AUTO: 0 10E3/UL
NRBC BLD AUTO-RTO: 0 /100
PLATELET # BLD AUTO: 312 10E3/UL (ref 150–450)
PROLACTIN SERPL 3RD IS-MCNC: 14 NG/ML (ref 5–23)
RBC # BLD AUTO: 4.18 10E6/UL (ref 3.8–5.2)
TRICHOMONAS, WET PREP: ABNORMAL
TSH SERPL DL<=0.005 MIU/L-ACNC: 1.49 UIU/ML (ref 0.3–4.2)
WBC # BLD AUTO: 12.4 10E3/UL (ref 4–11)
WBC'S/HIGH POWER FIELD, WET PREP: ABNORMAL
YEAST, WET PREP: ABNORMAL

## 2023-03-07 PROCEDURE — 36415 COLL VENOUS BLD VENIPUNCTURE: CPT | Mod: ZL

## 2023-03-07 PROCEDURE — 85025 COMPLETE CBC W/AUTO DIFF WBC: CPT | Mod: ZL

## 2023-03-07 PROCEDURE — G0463 HOSPITAL OUTPT CLINIC VISIT: HCPCS

## 2023-03-07 PROCEDURE — 87210 SMEAR WET MOUNT SALINE/INK: CPT | Mod: ZL

## 2023-03-07 PROCEDURE — 84443 ASSAY THYROID STIM HORMONE: CPT | Mod: ZL

## 2023-03-07 PROCEDURE — 99214 OFFICE O/P EST MOD 30 MIN: CPT

## 2023-03-07 PROCEDURE — 84146 ASSAY OF PROLACTIN: CPT | Mod: ZL

## 2023-03-07 RX ORDER — METRONIDAZOLE 7.5 MG/G
1 GEL VAGINAL DAILY
Qty: 25 G | Refills: 0 | Status: SHIPPED | OUTPATIENT
Start: 2023-03-07 | End: 2023-03-12

## 2023-03-07 ASSESSMENT — ENCOUNTER SYMPTOMS
LIGHT-HEADEDNESS: 0
POLYDIPSIA: 0
FLANK PAIN: 0
DIZZINESS: 0
FATIGUE: 1
ABDOMINAL PAIN: 0
DYSURIA: 0
FREQUENCY: 0
NAUSEA: 0
POLYPHAGIA: 0
VOMITING: 0

## 2023-03-07 ASSESSMENT — ANXIETY QUESTIONNAIRES
4. TROUBLE RELAXING: NOT AT ALL
8. IF YOU CHECKED OFF ANY PROBLEMS, HOW DIFFICULT HAVE THESE MADE IT FOR YOU TO DO YOUR WORK, TAKE CARE OF THINGS AT HOME, OR GET ALONG WITH OTHER PEOPLE?: SOMEWHAT DIFFICULT
GAD7 TOTAL SCORE: 2
GAD7 TOTAL SCORE: 2
1. FEELING NERVOUS, ANXIOUS, OR ON EDGE: NOT AT ALL
5. BEING SO RESTLESS THAT IT IS HARD TO SIT STILL: NOT AT ALL
7. FEELING AFRAID AS IF SOMETHING AWFUL MIGHT HAPPEN: NOT AT ALL
2. NOT BEING ABLE TO STOP OR CONTROL WORRYING: SEVERAL DAYS
3. WORRYING TOO MUCH ABOUT DIFFERENT THINGS: SEVERAL DAYS
7. FEELING AFRAID AS IF SOMETHING AWFUL MIGHT HAPPEN: NOT AT ALL
GAD7 TOTAL SCORE: 2
IF YOU CHECKED OFF ANY PROBLEMS ON THIS QUESTIONNAIRE, HOW DIFFICULT HAVE THESE PROBLEMS MADE IT FOR YOU TO DO YOUR WORK, TAKE CARE OF THINGS AT HOME, OR GET ALONG WITH OTHER PEOPLE: SOMEWHAT DIFFICULT
6. BECOMING EASILY ANNOYED OR IRRITABLE: NOT AT ALL

## 2023-03-07 ASSESSMENT — PATIENT HEALTH QUESTIONNAIRE - PHQ9
SUM OF ALL RESPONSES TO PHQ QUESTIONS 1-9: 4
SUM OF ALL RESPONSES TO PHQ QUESTIONS 1-9: 4
10. IF YOU CHECKED OFF ANY PROBLEMS, HOW DIFFICULT HAVE THESE PROBLEMS MADE IT FOR YOU TO DO YOUR WORK, TAKE CARE OF THINGS AT HOME, OR GET ALONG WITH OTHER PEOPLE: SOMEWHAT DIFFICULT

## 2023-03-07 ASSESSMENT — PAIN SCALES - GENERAL: PAINLEVEL: MILD PAIN (2)

## 2023-03-07 NOTE — PROGRESS NOTES
Assessment & Plan   Tray Talavera is a 33 year old presenting for the following health issues:      ICD-10-CM    1. Bacterial vaginosis  N76.0 Wet Prep, Genital    B96.89 metroNIDAZOLE (METROGEL) 0.75 % vaginal gel      2. Irregular periods  N92.6 TSH Reflex GH     Prolactin     CBC and Differential     CBC and Differential     Prolactin     TSH Reflex GH        Basic lab work for irregular periods was done today including TSH, prolactin, CBC.  These all came back normal, however her wet prep showed clue cells and 3+ white blood cells consistent with bacterial vaginosis.  Being that this was a one-time episode of spotting between periods I suspect that this is related to her vaginal infection.  Prescribed her a 5-day course of intravaginal Flagyl, instructed her that if she continues to have spotting between periods or any other new symptoms that we can proceed with ultrasound imaging and referral to GYN for further work-up.  Patient verbalized understanding agreement to the current treatment plan.    Return if symptoms worsen or fail to improve.    YAYA Bridges [facility]      Subjective   Tray is a 33 year old, presenting for the following health issues:  Abnormal Bleeding Problem (Bleeding, spotting and cramping between periods   Liliana Rosenberg LPN on 3/7/2023 at 2:45 PM/)      HPI     Patient presents to clinic for concerns of an irregular menstrual cycle.  She states that she normally has regular periods every 28 days, however this last cycle she started spotting after her period ended a week after and then started spotting again with cramping. She denies having heavy periods, she is not on birth control, denies being pregnant before, is not sexually active.  She has had normal Pap smears, she has noted increased vaginal discharge.  She states that this is been the 1 cycle that she has had spotting, has not had this happen in the past.      Review of Systems    Constitutional: Positive for fatigue.   Gastrointestinal: Negative for abdominal pain, nausea and vomiting.   Endocrine: Negative for polydipsia, polyphagia and polyuria.   Genitourinary: Positive for pelvic pain ( left sided), vaginal bleeding and vaginal discharge. Negative for dysuria, flank pain, frequency and vaginal pain.   Neurological: Negative for dizziness and light-headedness.   All other systems reviewed and are negative.     Constitutional, HEENT, cardiovascular, pulmonary, gi and gu systems are negative, except as otherwise noted.      Objective    /72 (BP Location: Right arm, Patient Position: Sitting, Cuff Size: Adult Regular)   Pulse 69   Temp 98.7  F (37.1  C)   Resp 20   Wt 77.8 kg (171 lb 8 oz)   LMP 02/18/2023 (Exact Date)   SpO2 97%   BMI 28.11 kg/m    Body mass index is 28.11 kg/m .  Physical Exam  Vitals reviewed.   Constitutional:       General: She is not in acute distress.     Appearance: Normal appearance. She is not ill-appearing.   Eyes:      Conjunctiva/sclera: Conjunctivae normal.      Pupils: Pupils are equal, round, and reactive to light.   Cardiovascular:      Rate and Rhythm: Normal rate and regular rhythm.      Pulses: Normal pulses.      Heart sounds: Normal heart sounds. No murmur heard.  Pulmonary:      Effort: Pulmonary effort is normal. No respiratory distress.      Breath sounds: Normal breath sounds.   Abdominal:      Tenderness: There is abdominal tenderness ( LLQ cramping). There is no right CVA tenderness or left CVA tenderness.   Neurological:      Mental Status: She is alert.          Results for orders placed or performed in visit on 03/07/23   Prolactin     Status: Normal   Result Value Ref Range    Prolactin 14 5 - 23 ng/mL   TSH Reflex GH     Status: Normal   Result Value Ref Range    TSH 1.49 0.30 - 4.20 uIU/mL   CBC with platelets and differential     Status: Abnormal   Result Value Ref Range    WBC Count 12.4 (H) 4.0 - 11.0 10e3/uL    RBC Count  4.18 3.80 - 5.20 10e6/uL    Hemoglobin 13.5 11.7 - 15.7 g/dL    Hematocrit 39.9 35.0 - 47.0 %    MCV 96 78 - 100 fL    MCH 32.3 26.5 - 33.0 pg    MCHC 33.8 31.5 - 36.5 g/dL    RDW 12.1 10.0 - 15.0 %    Platelet Count 312 150 - 450 10e3/uL    % Neutrophils 63 %    % Lymphocytes 29 %    % Monocytes 6 %    % Eosinophils 1 %    % Basophils 1 %    % Immature Granulocytes 0 %    NRBCs per 100 WBC 0 <1 /100    Absolute Neutrophils 7.9 1.6 - 8.3 10e3/uL    Absolute Lymphocytes 3.6 0.8 - 5.3 10e3/uL    Absolute Monocytes 0.7 0.0 - 1.3 10e3/uL    Absolute Eosinophils 0.1 0.0 - 0.7 10e3/uL    Absolute Basophils 0.1 0.0 - 0.2 10e3/uL    Absolute Immature Granulocytes 0.0 <=0.4 10e3/uL    Absolute NRBCs 0.0 10e3/uL   Wet Prep, Genital     Status: Abnormal    Specimen: Vagina; Swab   Result Value Ref Range    Trichomonas Absent Absent    Yeast Absent Absent    Clue Cells Present (A) Absent    WBCs/high power field 3+ (A) None   CBC and Differential     Status: Abnormal    Narrative    The following orders were created for panel order CBC and Differential.  Procedure                               Abnormality         Status                     ---------                               -----------         ------                     CBC with platelets and d...[[id number]]  Abnormal            Final result                 Please view results for these tests on the individual orders.

## 2023-03-08 NOTE — TELEPHONE ENCOUNTER
Quentin Argueta,     This was supposed to go to Lakia Montgomery.     Thanks!  Lakia Erazo PA-C on 3/8/2023 at 7:31 AM

## 2023-03-09 RX ORDER — TRAZODONE HYDROCHLORIDE 50 MG/1
50 TABLET, FILM COATED ORAL AT BEDTIME
Qty: 30 TABLET | Refills: 0 | Status: SHIPPED | OUTPATIENT
Start: 2023-03-09 | End: 2023-04-07

## 2023-03-09 NOTE — TELEPHONE ENCOUNTER
Routing refill request to provider for review/approval because:    LOV: 2/7/23    Shaniqua Patel RN on 3/9/2023 at 2:24 PM

## 2023-03-23 DIAGNOSIS — F43.22 ADJUSTMENT DISORDER WITH ANXIOUS MOOD: ICD-10-CM

## 2023-03-23 RX ORDER — BUSPIRONE HYDROCHLORIDE 10 MG/1
TABLET ORAL
Qty: 270 TABLET | Refills: 1 | Status: SHIPPED | OUTPATIENT
Start: 2023-03-23 | End: 2024-08-16

## 2023-03-23 NOTE — TELEPHONE ENCOUNTER
[facility] sent Rx request for the following:    buspirone 10 mg tablet  Last Prescription Date:   9/16/22  Last Fill Qty/Refills:         270, R-1    Last Office Visit:              3/7/23   Future Office visit:           None     Rula Villagomez RN on 3/23/2023 at 1:49 PM

## 2023-04-06 DIAGNOSIS — F41.9 ANXIETY: ICD-10-CM

## 2023-04-06 DIAGNOSIS — G47.9 SLEEP DISORDER: ICD-10-CM

## 2023-04-06 DIAGNOSIS — F32.1 MODERATE MAJOR DEPRESSION (H): ICD-10-CM

## 2023-04-07 NOTE — TELEPHONE ENCOUNTER
[facility] sent Rx request for the following:      Requested Prescriptions   Pending Prescriptions Disp Refills     citalopram (CELEXA) 40 MG tablet [Pharmacy Med Name: citalopram 40 mg tablet] 30 tablet 1     Sig: Take 1 tablet (40 mg) by mouth daily   Last Prescription Date:   2/7/23  Last Fill Qty/Refills:         30, R-1         traZODone (DESYREL) 50 MG tablet [Pharmacy Med Name: trazodone 50 mg tablet] 30 tablet 1     Sig: Take 1 tablet (50 mg) by mouth At Bedtime   Last Prescription Date:   3/9/23  Last Fill Qty/Refills:         30, R-0      Last Office Visit:              2/7/23  Future Office visit:           None    Return in about 4 weeks (around 3/7/2023), or if symptoms worsen or fail to improve.    Pt due for follow up. Routing to provider for refill consideration. Routing to  OUTREACH APPT REQUESTS pool, to assist Pt in scheduling appointment.     Ruth Clark RN .............. 4/7/2023  4:24 PM

## 2023-04-09 RX ORDER — CITALOPRAM HYDROBROMIDE 40 MG/1
40 TABLET ORAL DAILY
Qty: 30 TABLET | Refills: 0 | Status: SHIPPED | OUTPATIENT
Start: 2023-04-09 | End: 2023-05-05

## 2023-04-09 RX ORDER — TRAZODONE HYDROCHLORIDE 50 MG/1
50 TABLET, FILM COATED ORAL AT BEDTIME
Qty: 30 TABLET | Refills: 0 | Status: SHIPPED | OUTPATIENT
Start: 2023-04-09 | End: 2023-05-05

## 2023-04-10 ENCOUNTER — MYC REFILL (OUTPATIENT)
Dept: FAMILY MEDICINE | Facility: OTHER | Age: 33
End: 2023-04-10
Payer: COMMERCIAL

## 2023-04-10 DIAGNOSIS — F41.9 ANXIETY: ICD-10-CM

## 2023-04-10 DIAGNOSIS — G47.9 SLEEP DISORDER: ICD-10-CM

## 2023-04-10 DIAGNOSIS — F32.1 MODERATE MAJOR DEPRESSION (H): ICD-10-CM

## 2023-04-10 RX ORDER — TRAZODONE HYDROCHLORIDE 50 MG/1
50 TABLET, FILM COATED ORAL AT BEDTIME
Qty: 30 TABLET | Refills: 0 | Status: CANCELLED | OUTPATIENT
Start: 2023-04-10

## 2023-04-10 RX ORDER — CITALOPRAM HYDROBROMIDE 40 MG/1
40 TABLET ORAL DAILY
Qty: 30 TABLET | Refills: 0 | Status: CANCELLED | OUTPATIENT
Start: 2023-04-10

## 2023-04-11 NOTE — TELEPHONE ENCOUNTER
citalopram (CELEXA) 40 MG tablet 30 tablet 0 4/9/2023  No   Sig - Route: Take 1 tablet (40 mg) by mouth daily      traZODone (DESYREL) 50 MG tablet 30 tablet 0 4/9/2023  No   Sig - Route: Take 1 tablet (50 mg) by mouth At Bedtime      To [facility] pharmacy  [facility] requesting  Refill to soon  Patient due for follow up   My chart message sent  Shaniqua Patel RN on 4/11/2023 at 11:20 AM

## 2023-05-04 DIAGNOSIS — F41.9 ANXIETY: ICD-10-CM

## 2023-05-04 DIAGNOSIS — G47.9 SLEEP DISORDER: ICD-10-CM

## 2023-05-04 DIAGNOSIS — F32.1 MODERATE MAJOR DEPRESSION (H): ICD-10-CM

## 2023-05-05 RX ORDER — TRAZODONE HYDROCHLORIDE 50 MG/1
50 TABLET, FILM COATED ORAL AT BEDTIME
Qty: 90 TABLET | Refills: 3 | Status: SHIPPED | OUTPATIENT
Start: 2023-05-05 | End: 2023-10-31

## 2023-05-05 RX ORDER — CITALOPRAM HYDROBROMIDE 40 MG/1
40 TABLET ORAL DAILY
Qty: 90 TABLET | Refills: 3 | Status: SHIPPED | OUTPATIENT
Start: 2023-05-05 | End: 2024-08-16

## 2023-06-08 ENCOUNTER — OFFICE VISIT (OUTPATIENT)
Dept: FAMILY MEDICINE | Facility: OTHER | Age: 33
End: 2023-06-08
Payer: COMMERCIAL

## 2023-06-08 VITALS
OXYGEN SATURATION: 97 % | DIASTOLIC BLOOD PRESSURE: 68 MMHG | HEART RATE: 62 BPM | SYSTOLIC BLOOD PRESSURE: 128 MMHG | HEIGHT: 65 IN | WEIGHT: 174 LBS | BODY MASS INDEX: 28.99 KG/M2 | TEMPERATURE: 97.4 F | RESPIRATION RATE: 16 BRPM

## 2023-06-08 DIAGNOSIS — K04.7 DENTAL INFECTION: Primary | ICD-10-CM

## 2023-06-08 PROCEDURE — G0463 HOSPITAL OUTPT CLINIC VISIT: HCPCS

## 2023-06-08 PROCEDURE — 99213 OFFICE O/P EST LOW 20 MIN: CPT | Performed by: STUDENT IN AN ORGANIZED HEALTH CARE EDUCATION/TRAINING PROGRAM

## 2023-06-08 RX ORDER — IBUPROFEN 200 MG
600 TABLET ORAL EVERY 4 HOURS PRN
COMMUNITY

## 2023-06-08 RX ORDER — CLINDAMYCIN HCL 150 MG
450 CAPSULE ORAL 3 TIMES DAILY
Qty: 90 CAPSULE | Refills: 0 | Status: SHIPPED | OUTPATIENT
Start: 2023-06-08 | End: 2023-06-18

## 2023-06-08 ASSESSMENT — PATIENT HEALTH QUESTIONNAIRE - PHQ9
10. IF YOU CHECKED OFF ANY PROBLEMS, HOW DIFFICULT HAVE THESE PROBLEMS MADE IT FOR YOU TO DO YOUR WORK, TAKE CARE OF THINGS AT HOME, OR GET ALONG WITH OTHER PEOPLE: NOT DIFFICULT AT ALL
SUM OF ALL RESPONSES TO PHQ QUESTIONS 1-9: 0
SUM OF ALL RESPONSES TO PHQ QUESTIONS 1-9: 0

## 2023-06-08 ASSESSMENT — PAIN SCALES - GENERAL: PAINLEVEL: SEVERE PAIN (7)

## 2023-06-08 NOTE — NURSING NOTE
"Chief Complaint   Patient presents with     Dental Pain     Filling feel out about a  year ago.   Pain for the last couple of days.  Felt feverish.         Initial /68 (BP Location: Right arm, Patient Position: Sitting, Cuff Size: Adult Regular)   Pulse 62   Temp 97.4  F (36.3  C) (Temporal)   Resp 16   Ht 1.657 m (5' 5.25\")   Wt 78.9 kg (174 lb)   LMP 06/06/2023 (Approximate)   SpO2 97%   Breastfeeding No   BMI 28.73 kg/m   Estimated body mass index is 28.73 kg/m  as calculated from the following:    Height as of this encounter: 1.657 m (5' 5.25\").    Weight as of this encounter: 78.9 kg (174 lb).       FOOD SECURITY SCREENING QUESTIONS:    The next two questions are to help us understand your food security.  If you are feeling you need any assistance in this area, we have resources available to support you today.    Hunger Vital Signs:  Within the past 12 months we worried whether our food would run out before we got money to buy more. Never  Within the past 12 months the food we bought just didn't last and we didn't have money to get more. Never    Advance Care Directive on file? no      Medication reconciliation complete.      Prosper Dennison,on 6/8/2023 at 4:24 PM        "

## 2023-06-08 NOTE — PROGRESS NOTES
"  Assessment & Plan     (K04.7) Dental infection  (primary encounter diagnosis)  Comment: Most likely dental infection, poor dentition in that tooth.  She had not been able to get her feeling refilled.  Chills yesterday, no fever or abnormal vital signs in the office at this time.  She has been taking ibuprofen.  Plan: clindamycin (CLEOCIN) 150 MG capsule      Plan to treat with clindamycin 3 times a day for 10 days, she does have allergies to penicillins.  Discussed alternating ibuprofen and Tylenol and dosages.  Follow-up with dentist as soon as possible.  Discussed that the symptoms worsen or change in the meantime, she needs to return to rapid clinic or go to the ER to be seen.  She is agreeable.      Aubrie Ruiz PA-C  [facility] is a 33 year old, presenting for the following health issues:  Dental Pain (Filling feel out about a  year ago.   Pain for the last couple of days.  Felt feverish.)      HPI     Patient presents today with pain in tooth.  States this tooth did have a filling that fell out about a year ago.  Since then, she has not had anything for it.  Over the last few days, has become much more painful, possibly some scant drainage.  She has not noticed any swelling of the gumline.  She does endorse some chills yesterday though did not check her temperature.  She has been taking 600 mg of ibuprofen every 4 hours for the pain.  She did call a dentist but was unable to be seen today.      Review of Systems   See [facility] for relevant review of systems      Objective    /68 (BP Location: Right arm, Patient Position: Sitting, Cuff Size: Adult Regular)   Pulse 62   Temp 97.4  F (36.3  C) (Temporal)   Resp 16   Ht 1.657 m (5' 5.25\")   Wt 78.9 kg (174 lb)   LMP 06/06/2023 (Approximate)   SpO2 97%   Breastfeeding No   BMI 28.73 kg/m    Body mass index is 28.73 kg/m .  Physical Exam  Constitutional:       Appearance: Normal appearance.   HENT:    "   Head: Normocephalic and atraumatic.      Nose: Nose normal.      Mouth/Throat:      Mouth: Mucous membranes are moist.      Pharynx: Oropharynx is clear.        Comments: Patient has dark: Tooth where a filling fell out, very faint swelling of the gumline, tenderness to palpation on both the medial and lateral gumline around the tooth, tenderness with palpation of the tooth, no swelling of the oropharynx  Eyes:      Pupils: Pupils are equal, round, and reactive to light.   Pulmonary:      Effort: Pulmonary effort is normal.   Neurological:      Mental Status: She is alert.

## 2023-06-08 NOTE — PATIENT INSTRUCTIONS
Dental Infection    Clindamycin 3 times a day for 10 days.  Continue ibuprofen/tylenol alternating.     Ibuprofen is 600 mg every 6 hours.   Tylenol 1000 mg every 6 hours.     Salt water gargles.    Follow up with dentist.

## 2023-10-25 NOTE — PROGRESS NOTES
SUBJECTIVE:   CC: Tray is an 33 year old who presents for preventive health visit.       History of Present Illness       Reason for visit:  Irregular bleeding among other health concerns    She eats 0-1 servings of fruits and vegetables daily.She consumes 1 sweetened beverage(s) daily.She exercises with enough effort to increase her heart rate 9 or less minutes per day.  She exercises with enough effort to increase her heart rate 3 or less days per week.   She is taking medications regularly.    Here for annual physical.     Menstrual bleeding:  3-4 months if excessive, frequent bleeding. Reports 3 our of four weeks of month she had been bleeding. As of last couple weeks bleeding has resolved. No associated pelvic or abdominal pain, vaginal discharge, itching. Denies pregnancy or STD concerns.     Weight:  Chronically with increasing weight.  Weight seem to increase starting about 1 and half years ago.  Really noticing increasing weight since starting Abilify a few months ago.  Follows daily for behavioral for management of mental health.  Reports she has been fairly inactive since being unemployed.  Often is not eating breakfast, average lunch is a sandwich and dinner is noodles.  Not a lot of protein and vegetables/fruit intake.  Patient requested basic labs for rule out physical causes.    Contraception: None  Risk for STI?: No  Last pap: 09/2021, NIL, HPV-  Any hx of abnormal paps:  No  Cholesterol/DM concerns/screening: Due  Tobacco?: 1 ppd  Calcium intake: Dietary  DEXA: Not indicated based on patient age and health status.   Last mammo: Not indicated based on patient age and health status.   Colonoscopy: Not indicated based on patient age and health status.   Hepatitis C screen: Low risk, declines  HIV screen: Low risk, declines  Immunizations: COVID booster        Social History     Tobacco Use    Smoking status: Every Day     Packs/day: 1     Types: Cigarettes     Start date: 2002    Smokeless  tobacco: Never   Substance Use Topics    Alcohol use: Yes     Comment: Alcoholic Drinks/day: Rarely / maybe a couple a month             2/7/2023     8:47 AM   Alcohol Use   Prescreen: >3 drinks/day or >7 drinks/week? No     Reviewed orders with patient.  Reviewed health maintenance and updated orders accordingly - Yes      Breast Cancer Screening:  Any new diagnosis of family breast, ovarian, or bowel cancer? No      FHS-7:        No data to display                Patient under 40 years of age: Routine Mammogram Screening not recommended.   Pertinent mammograms are reviewed under the imaging tab.    History of abnormal Pap smear: Last 3 Pap and HPV Results:       Latest Ref Rng & Units 9/20/2021     3:21 PM 9/20/2021     3:20 PM   PAP / HPV   PAP   Negative for Intraepithelial Lesion or Malignancy (NILM)    HPV 16 DNA Negative Negative     HPV 18 DNA Negative Negative     Other HR HPV Negative Negative           Latest Ref Rng & Units 9/20/2021     3:21 PM 9/20/2021     3:20 PM   PAP / HPV   PAP   Negative for Intraepithelial Lesion or Malignancy (NILM)    HPV 16 DNA Negative Negative     HPV 18 DNA Negative Negative     Other HR HPV Negative Negative       Reviewed and updated as needed this visit by clinical staff   Tobacco  Allergies  Meds              Reviewed and updated as needed this visit by Provider                 Past Medical History:   Diagnosis Date    Personal history of other mental and behavioral disorders     No Comments Provided    Personal history of urinary infection     No Comments Provided      Past Surgical History:   Procedure Laterality Date    APPENDECTOMY OPEN      09/05,Open, [facility]    ESOPHAGOSCOPY, GASTROSCOPY, DUODENOSCOPY (EGD), COMBINED      02/23/2012,by Dr. Jama. bile reflux and gastropathy     OB History   No obstetric history on file.       Review of Systems  CONSTITUTIONAL: NEGATIVE for fever, chills, change in weight  INTEGUMENTARU/SKIN: NEGATIVE for worrisome rashes, moles  "or lesions  EYES: NEGATIVE for vision changes or irritation  ENT: NEGATIVE for ear, mouth and throat problems  RESP: NEGATIVE for significant cough or SOB  BREAST: NEGATIVE for masses, tenderness or discharge  CV: NEGATIVE for chest pain, palpitations or peripheral edema  GI: NEGATIVE for nausea, abdominal pain, heartburn, or change in bowel habits   female: menses: irregular excessive and frequent  MUSCULOSKELETAL: NEGATIVE for significant arthralgias or myalgia  NEURO: NEGATIVE for weakness, dizziness or paresthesias  PSYCHIATRIC: NEGATIVE for changes in mood or affect     OBJECTIVE:   /68 (BP Location: Right arm, Patient Position: Sitting, Cuff Size: Adult Large)   Pulse 70   Temp 97.6  F (36.4  C) (Tympanic)   Resp 20   Ht 1.664 m (5' 5.5\")   Wt 90.3 kg (199 lb)   LMP 09/30/2023 (Approximate)   SpO2 98%   BMI 32.61 kg/m    Physical Exam  GENERAL: healthy, alert and no distress  EYES: Eyes grossly normal to inspection, PERRL and conjunctivae and sclerae normal  HENT: ear canals and TM's normal, nose and mouth without ulcers or lesions  NECK: no adenopathy, no asymmetry, masses, or scars and thyroid normal to palpation  RESP: lungs clear to auscultation - no rales, rhonchi or wheezes  CV: regular rate and rhythm, normal S1 S2, no S3 or S4, no murmur, click or rub, no peripheral edema and peripheral pulses strong  : Patient deferred due to no current bleeding  MS: no gross musculoskeletal defects noted, no edema  SKIN: no suspicious lesions or rashes  NEURO: Normal strength and tone, mentation intact and speech normal  PSYCH: mentation appears normal, affect normal/bright    Diagnostic Test Results:  Labs reviewed in Epic    ASSESSMENT/PLAN:       ICD-10-CM    1. Routine history and physical examination of adult  Z00.00 CBC and Differential     Basic Metabolic Panel     TSH Reflex GH     Hemoglobin A1c     Lipid Panel     Lipid Panel     Hemoglobin A1c     TSH Reflex GH     Basic Metabolic Panel " "    CBC and Differential      2. Menometrorrhagia  N92.1 CBC and Differential     Basic Metabolic Panel     TSH Reflex GH     TSH Reflex GH     Basic Metabolic Panel     CBC and Differential      3. Class 1 obesity due to excess calories without serious comorbidity with body mass index (BMI) of 32.0 to 32.9 in adult  E66.09 CBC and Differential    Z68.32 Basic Metabolic Panel     TSH Reflex GH     Hemoglobin A1c     Lipid Panel     Lipid Panel     Hemoglobin A1c     TSH Reflex GH     Basic Metabolic Panel     CBC and Differential      4. Adjustment disorder with anxious mood  F43.22       5. Primary insomnia  F51.01         Up-to-date on preventative exams, immunizations.  Declined COVID and pneumonia vaccines.  Encouraged tobacco cessation.  Encourage focusing on healthy lifestyle.    2.  Differential includes hormone imbalance, infection, pregnancy, STD, fibroid, adenomyosis, etc.  Vital stable, patient deferred pelvic exam.  Lab work pending as above.  Likely consider pelvic ultrasound for additional evaluation.    3. Chronic, progressing. Labs pending as above. Discussed possible medication side effect, related to mental health, healthy lifestyle with appropriate macronutrient and caloric intake, increasing physical activity. Will notify with labs and discuss options going forward.     4, 5. Chronic, stable. Follows with mental health.     Patient has been advised of split billing requirements and indicates understanding: Yes      COUNSELING:  Reviewed preventive health counseling, as reflected in patient instructions      BMI:   Estimated body mass index is 32.61 kg/m  as calculated from the following:    Height as of this encounter: 1.664 m (5' 5.5\").    Weight as of this encounter: 90.3 kg (199 lb).   Weight management plan: Discussed healthy diet and exercise guidelines      She reports that she has been smoking cigarettes. She started smoking about 21 years ago. She has been smoking an average of 1 pack per " day. She has never used smokeless tobacco.  Nicotine/Tobacco Cessation Plan:   Information offered: Patient not interested at this time          Lakia Erazo PA-C  [facility] AND [facility]

## 2023-10-30 ASSESSMENT — PATIENT HEALTH QUESTIONNAIRE - PHQ9
SUM OF ALL RESPONSES TO PHQ QUESTIONS 1-9: 7
10. IF YOU CHECKED OFF ANY PROBLEMS, HOW DIFFICULT HAVE THESE PROBLEMS MADE IT FOR YOU TO DO YOUR WORK, TAKE CARE OF THINGS AT HOME, OR GET ALONG WITH OTHER PEOPLE: SOMEWHAT DIFFICULT
SUM OF ALL RESPONSES TO PHQ QUESTIONS 1-9: 7

## 2023-10-31 ENCOUNTER — OFFICE VISIT (OUTPATIENT)
Dept: FAMILY MEDICINE | Facility: OTHER | Age: 33
End: 2023-10-31
Attending: PHYSICIAN ASSISTANT
Payer: COMMERCIAL

## 2023-10-31 VITALS
OXYGEN SATURATION: 98 % | HEIGHT: 66 IN | RESPIRATION RATE: 20 BRPM | SYSTOLIC BLOOD PRESSURE: 110 MMHG | WEIGHT: 199 LBS | HEART RATE: 70 BPM | TEMPERATURE: 97.6 F | BODY MASS INDEX: 31.98 KG/M2 | DIASTOLIC BLOOD PRESSURE: 68 MMHG

## 2023-10-31 DIAGNOSIS — F51.01 PRIMARY INSOMNIA: ICD-10-CM

## 2023-10-31 DIAGNOSIS — Z00.00 ROUTINE HISTORY AND PHYSICAL EXAMINATION OF ADULT: Primary | ICD-10-CM

## 2023-10-31 DIAGNOSIS — E66.09 CLASS 1 OBESITY DUE TO EXCESS CALORIES WITHOUT SERIOUS COMORBIDITY WITH BODY MASS INDEX (BMI) OF 32.0 TO 32.9 IN ADULT: ICD-10-CM

## 2023-10-31 DIAGNOSIS — N92.1 MENOMETRORRHAGIA: ICD-10-CM

## 2023-10-31 DIAGNOSIS — E66.811 CLASS 1 OBESITY DUE TO EXCESS CALORIES WITHOUT SERIOUS COMORBIDITY WITH BODY MASS INDEX (BMI) OF 32.0 TO 32.9 IN ADULT: ICD-10-CM

## 2023-10-31 DIAGNOSIS — F43.22 ADJUSTMENT DISORDER WITH ANXIOUS MOOD: ICD-10-CM

## 2023-10-31 LAB
ANION GAP SERPL CALCULATED.3IONS-SCNC: 10 MMOL/L (ref 7–15)
BASOPHILS # BLD AUTO: 0.1 10E3/UL (ref 0–0.2)
BASOPHILS NFR BLD AUTO: 1 %
BUN SERPL-MCNC: 14.3 MG/DL (ref 6–20)
CALCIUM SERPL-MCNC: 9.5 MG/DL (ref 8.6–10)
CHLORIDE SERPL-SCNC: 108 MMOL/L (ref 98–107)
CHOLEST SERPL-MCNC: 230 MG/DL
CREAT SERPL-MCNC: 1.02 MG/DL (ref 0.51–0.95)
DEPRECATED HCO3 PLAS-SCNC: 22 MMOL/L (ref 22–29)
EGFRCR SERPLBLD CKD-EPI 2021: 73 ML/MIN/1.73M2
EOSINOPHIL # BLD AUTO: 0.1 10E3/UL (ref 0–0.7)
EOSINOPHIL NFR BLD AUTO: 2 %
ERYTHROCYTE [DISTWIDTH] IN BLOOD BY AUTOMATED COUNT: 11.6 % (ref 10–15)
GLUCOSE SERPL-MCNC: 95 MG/DL (ref 70–99)
HBA1C MFR BLD: 5.1 % (ref 4–6.2)
HCT VFR BLD AUTO: 40 % (ref 35–47)
HDLC SERPL-MCNC: 37 MG/DL
HGB BLD-MCNC: 14 G/DL (ref 11.7–15.7)
IMM GRANULOCYTES # BLD: 0 10E3/UL
IMM GRANULOCYTES NFR BLD: 0 %
LDLC SERPL CALC-MCNC: 156 MG/DL
LYMPHOCYTES # BLD AUTO: 2.5 10E3/UL (ref 0.8–5.3)
LYMPHOCYTES NFR BLD AUTO: 27 %
MCH RBC QN AUTO: 32.7 PG (ref 26.5–33)
MCHC RBC AUTO-ENTMCNC: 35 G/DL (ref 31.5–36.5)
MCV RBC AUTO: 94 FL (ref 78–100)
MONOCYTES # BLD AUTO: 0.5 10E3/UL (ref 0–1.3)
MONOCYTES NFR BLD AUTO: 6 %
NEUTROPHILS # BLD AUTO: 6 10E3/UL (ref 1.6–8.3)
NEUTROPHILS NFR BLD AUTO: 64 %
NONHDLC SERPL-MCNC: 193 MG/DL
NRBC # BLD AUTO: 0 10E3/UL
NRBC BLD AUTO-RTO: 0 /100
PLATELET # BLD AUTO: 345 10E3/UL (ref 150–450)
POTASSIUM SERPL-SCNC: 4.3 MMOL/L (ref 3.4–5.3)
RBC # BLD AUTO: 4.28 10E6/UL (ref 3.8–5.2)
SODIUM SERPL-SCNC: 140 MMOL/L (ref 135–145)
TRIGL SERPL-MCNC: 185 MG/DL
TSH SERPL DL<=0.005 MIU/L-ACNC: 1.05 UIU/ML (ref 0.3–4.2)
WBC # BLD AUTO: 9.3 10E3/UL (ref 4–11)

## 2023-10-31 PROCEDURE — 99395 PREV VISIT EST AGE 18-39: CPT | Performed by: PHYSICIAN ASSISTANT

## 2023-10-31 PROCEDURE — 85025 COMPLETE CBC W/AUTO DIFF WBC: CPT | Mod: ZL | Performed by: PHYSICIAN ASSISTANT

## 2023-10-31 PROCEDURE — G0463 HOSPITAL OUTPT CLINIC VISIT: HCPCS

## 2023-10-31 PROCEDURE — 80048 BASIC METABOLIC PNL TOTAL CA: CPT | Mod: ZL | Performed by: PHYSICIAN ASSISTANT

## 2023-10-31 PROCEDURE — 84443 ASSAY THYROID STIM HORMONE: CPT | Mod: ZL | Performed by: PHYSICIAN ASSISTANT

## 2023-10-31 PROCEDURE — 83718 ASSAY OF LIPOPROTEIN: CPT | Mod: ZL | Performed by: PHYSICIAN ASSISTANT

## 2023-10-31 PROCEDURE — 36415 COLL VENOUS BLD VENIPUNCTURE: CPT | Mod: ZL | Performed by: PHYSICIAN ASSISTANT

## 2023-10-31 PROCEDURE — 99214 OFFICE O/P EST MOD 30 MIN: CPT | Mod: 25 | Performed by: PHYSICIAN ASSISTANT

## 2023-10-31 PROCEDURE — 83036 HEMOGLOBIN GLYCOSYLATED A1C: CPT | Mod: ZL | Performed by: PHYSICIAN ASSISTANT

## 2023-10-31 RX ORDER — TRAZODONE HYDROCHLORIDE 100 MG/1
100 TABLET ORAL AT BEDTIME
COMMUNITY
Start: 2023-10-11

## 2023-10-31 RX ORDER — ARIPIPRAZOLE 5 MG/1
5 TABLET ORAL DAILY
COMMUNITY
Start: 2023-10-30 | End: 2024-08-16

## 2023-10-31 ASSESSMENT — PAIN SCALES - GENERAL: PAINLEVEL: MILD PAIN (2)

## 2023-10-31 NOTE — NURSING NOTE
"Patient presents to the clinic for physical.    FOOD SECURITY SCREENING QUESTIONS:    The next two questions are to help us understand your food security.  If you are feeling you need any assistance in this area, we have resources available to support you today.    Hunger Vital Signs:  Within the past 12 months we worried whether our food would run out before we got money to buy more. Never  Within the past 12 months the food we bought just didn't last and we didn't have money to get more. Never    Advance Care Directive on file? no  Advance Care Directive provided to patient? Declined.      Chief Complaint   Patient presents with    Physical       Initial /68 (BP Location: Right arm, Patient Position: Sitting, Cuff Size: Adult Large)   Pulse 70   Temp 97.6  F (36.4  C) (Tympanic)   Resp 20   Ht 1.664 m (5' 5.5\")   Wt 90.3 kg (199 lb)   LMP 09/30/2023 (Approximate)   SpO2 98%   BMI 32.61 kg/m   Estimated body mass index is 32.61 kg/m  as calculated from the following:    Height as of this encounter: 1.664 m (5' 5.5\").    Weight as of this encounter: 90.3 kg (199 lb).  Medication Reconciliation: complete        Marlen Chase LPN     "

## 2024-08-06 ENCOUNTER — OFFICE VISIT (OUTPATIENT)
Dept: FAMILY MEDICINE | Facility: OTHER | Age: 34
End: 2024-08-06
Attending: NURSE PRACTITIONER
Payer: COMMERCIAL

## 2024-08-06 ENCOUNTER — HOSPITAL ENCOUNTER (OUTPATIENT)
Dept: GENERAL RADIOLOGY | Facility: OTHER | Age: 34
Discharge: HOME | End: 2024-08-06
Attending: NURSE PRACTITIONER
Payer: COMMERCIAL

## 2024-08-06 VITALS
DIASTOLIC BLOOD PRESSURE: 85 MMHG | BODY MASS INDEX: 29.39 KG/M2 | RESPIRATION RATE: 18 BRPM | TEMPERATURE: 97.7 F | HEIGHT: 65 IN | WEIGHT: 176.4 LBS | OXYGEN SATURATION: 95 % | SYSTOLIC BLOOD PRESSURE: 136 MMHG | HEART RATE: 76 BPM

## 2024-08-06 DIAGNOSIS — R05.1 ACUTE COUGH: Primary | ICD-10-CM

## 2024-08-06 DIAGNOSIS — R06.2 WHEEZING: ICD-10-CM

## 2024-08-06 DIAGNOSIS — J40 BRONCHITIS: ICD-10-CM

## 2024-08-06 LAB
FLUAV RNA SPEC QL NAA+PROBE: NEGATIVE
FLUBV RNA RESP QL NAA+PROBE: NEGATIVE
RSV RNA SPEC NAA+PROBE: NEGATIVE
SARS-COV-2 RNA RESP QL NAA+PROBE: NEGATIVE

## 2024-08-06 PROCEDURE — 99214 OFFICE O/P EST MOD 30 MIN: CPT | Performed by: NURSE PRACTITIONER

## 2024-08-06 PROCEDURE — 71046 X-RAY EXAM CHEST 2 VIEWS: CPT

## 2024-08-06 PROCEDURE — 87637 SARSCOV2&INF A&B&RSV AMP PRB: CPT | Mod: ZL | Performed by: NURSE PRACTITIONER

## 2024-08-06 RX ORDER — AZITHROMYCIN 250 MG/1
TABLET, FILM COATED ORAL
Qty: 6 TABLET | Refills: 0 | Status: SHIPPED | OUTPATIENT
Start: 2024-08-06 | End: 2024-08-11

## 2024-08-06 RX ORDER — ALBUTEROL SULFATE 90 UG/1
2 AEROSOL, METERED RESPIRATORY (INHALATION) EVERY 6 HOURS PRN
Qty: 18 G | Refills: 0 | Status: SHIPPED | OUTPATIENT
Start: 2024-08-06

## 2024-08-06 RX ORDER — BUPROPION HYDROCHLORIDE 300 MG/1
300 TABLET ORAL EVERY MORNING
COMMUNITY
Start: 2024-07-12

## 2024-08-06 RX ORDER — PROPRANOLOL HYDROCHLORIDE 10 MG/1
TABLET ORAL
COMMUNITY
Start: 2024-06-14

## 2024-08-06 RX ORDER — PREDNISONE 20 MG/1
40 TABLET ORAL DAILY
Qty: 10 TABLET | Refills: 0 | Status: SHIPPED | OUTPATIENT
Start: 2024-08-06 | End: 2024-08-11

## 2024-08-06 ASSESSMENT — ENCOUNTER SYMPTOMS
ACTIVITY CHANGE: 1
GASTROINTESTINAL NEGATIVE: 1
DIZZINESS: 1
PSYCHIATRIC NEGATIVE: 1
MUSCULOSKELETAL NEGATIVE: 1
CARDIOVASCULAR NEGATIVE: 1
SHORTNESS OF BREATH: 1
COUGH: 1
APPETITE CHANGE: 1
ENDOCRINE NEGATIVE: 1
FATIGUE: 1
EYES NEGATIVE: 1
HEMATOLOGIC/LYMPHATIC NEGATIVE: 1

## 2024-08-06 ASSESSMENT — PATIENT HEALTH QUESTIONNAIRE - PHQ9
SUM OF ALL RESPONSES TO PHQ QUESTIONS 1-9: 0
10. IF YOU CHECKED OFF ANY PROBLEMS, HOW DIFFICULT HAVE THESE PROBLEMS MADE IT FOR YOU TO DO YOUR WORK, TAKE CARE OF THINGS AT HOME, OR GET ALONG WITH OTHER PEOPLE: NOT DIFFICULT AT ALL
SUM OF ALL RESPONSES TO PHQ QUESTIONS 1-9: 0

## 2024-08-06 ASSESSMENT — PAIN SCALES - GENERAL: PAINLEVEL: NO PAIN (0)

## 2024-08-06 NOTE — NURSING NOTE
"Chief Complaint   Patient presents with    Cough     Raspy cough - feels and sounds like it's productive but nothing will come up    Dizziness     lightheaded    Shortness of Breath     Feels like she can't get enough air     Patient presents today with ongoing deep / raspy cough, (sounds like it's productive but she can't cough anything up), dyspnea, and lightheaded feeling. She said this first started last Wednesday with the cough and has worsened since, then developed the SOB and dyspnea. She was taking Alkaseltzer plus for Cold and Cough. After a day or 2  of this the dizzy feeling started so she stopped taking it as she thought maybe the medicine was causing this.       Initial /85 (BP Location: Right arm, Patient Position: Sitting, Cuff Size: Adult Regular)   Pulse 76   Temp 97.7  F (36.5  C) (Temporal)   Resp 18   Ht 1.651 m (5' 5\")   Wt 80 kg (176 lb 6.4 oz)   LMP 07/30/2024 (Approximate)   SpO2 95%   BMI 29.35 kg/m   Estimated body mass index is 29.35 kg/m  as calculated from the following:    Height as of this encounter: 1.651 m (5' 5\").    Weight as of this encounter: 80 kg (176 lb 6.4 oz).     FOOD SECURITY SCREENING QUESTIONS:    The next two questions are to help us understand your food security.  If you are feeling you need any assistance in this area, we have resources available to support you today.    Hunger Vital Signs:  Within the past 12 months we worried whether our food would run out before we got money to buy more. Never  Within the past 12 months the food we bought just didn't last and we didn't have money to get more. Never      Zach Franco    "

## 2024-08-06 NOTE — LETTER
August 6, 2024      Tray Talavera  [address]   [facility] [id number]        To Whom It May Concern:    Tray Talavera was seen on 8/6/24.  Please excuse her from 8/1/24 - 8/8/24 due to respiratory illness.      Sincerely,        Luciana Grove, CNP

## 2024-08-06 NOTE — PROGRESS NOTES
Tray Talavera  1990    ASSESSMENT/PLAN      Presents to rapid clinic with cough, congestion, shortness of breath, wheezing and dizziness for a week.  States she had improved in the first couple of days but now has been getting worse.  Patient does not have history of asthma or lung disease.  Chest x-ray negative for pneumonia.  COVID, influenza, RSV negative.  Will treat with Zithromax, prednisone and albuterol inhaler for her bronchitis. Patient's vitals are stable and she appears nontoxic.        1. Acute cough  2. Bronchitis  3. Wheezing    - Symptomatic Influenza A/B, RSV, & SARS-CoV2 PCR (COVID-19) Nose negative  - XR Chest 2 Views - negative for pneumonia    - azithromycin (ZITHROMAX) 250 MG tablet; Take 2 tablets (500 mg) by mouth daily for 1 day, THEN 1 tablet (250 mg) daily for 4 days.  Dispense: 6 tablet; Refill: 0  - albuterol (PROAIR HFA/PROVENTIL HFA/VENTOLIN HFA) 108 (90 Base) MCG/ACT inhaler; Inhale 2 puffs into the lungs every 6 hours as needed for shortness of breath, wheezing or cough  Dispense: 18 g; Refill: 0  - predniSONE (DELTASONE) 20 MG tablet; Take 2 tablets (40 mg) by mouth daily for 5 days  Dispense: 10 tablet; Refill: 0  - May use over-the-counter Tylenol or ibuprofen PRN  - Follow up as needed for new or worsening symptoms          *Explanation of diagnosis, treatment options and risk and benefits of medications reviewed with patient. Patient agrees with plan of care.  *All questions were answered.    *Red flags symptoms were discussed and patient was advised when they should return for reevaluation or for prompt emergency evaluation.   *Patient was given verbal and written instructions on plan of care. Instructions were printed or are available on [employer] on electronic AVS.   *We discussed potential side effects of any prescribed or recommended therapies, as well as expectations for response to treatments.  *Patient discharged in stable condition    Luciana Grove CNP  [place]  [facility]    SUBJECTIVE  CHIEF COMPLAINT/ REASON FOR VISIT  Patient presents with:  Cough: Raspy cough - feels and sounds like it's productive but nothing will come up  Dizziness: lightheaded  Shortness of Breath: Feels like she can't get enough air     HISTORY OF PRESENT ILLNESS  Tray Talavera is a pleasant 34 year old female presents to rapid clinic today with cough, congestion, shortness of breath since last Wednesday.  Patient has had a dry raspy cough, feels lightheaded and dizzy after coughing.  Patient has been using [place] cold and cough.  Patient denies fever or chills.  No sore throat.  Patient has had ongoing inspiratory wheezing, no history of asthma or lung disease.       I have reviewed the nursing notes.  I have reviewed allergies, medication list, problem list, and past medical history.    REVIEW OF SYSTEMS  Review of Systems   Constitutional:  Positive for activity change, appetite change and fatigue.   HENT:  Positive for congestion.    Eyes: Negative.    Respiratory:  Positive for cough and shortness of breath.    Cardiovascular: Negative.    Gastrointestinal: Negative.    Endocrine: Negative.    Genitourinary: Negative.    Musculoskeletal: Negative.    Skin: Negative.    Neurological:  Positive for dizziness.   Hematological: Negative.    Psychiatric/Behavioral: Negative.     All other systems reviewed and are negative.       VITAL SIGNS  Vitals:    08/06/24 0915   BP: 136/85   BP Location: Right arm   Patient Position: Sitting   Cuff Size: Adult Regular   Pulse: 76   Resp: 18   Temp: 97.7  F (36.5  C)   TempSrc: Temporal   SpO2: 95%   Weight: 80 kg (176 lb 6.4 oz)   Height: 1.651 m (5' 5\")      Body mass index is 29.35 kg/m .      OBJECTIVE  PHYSICAL EXAM  Physical Exam  Vitals and nursing note reviewed.   Constitutional:       Appearance: Normal appearance. She is ill-appearing.   HENT:      Head: Normocephalic.      Right Ear: Tympanic membrane normal.      Left Ear: " Tympanic membrane normal.      Nose: Congestion present.      Mouth/Throat:      Mouth: Mucous membranes are moist.   Eyes:      Pupils: Pupils are equal, round, and reactive to light.   Cardiovascular:      Rate and Rhythm: Normal rate and regular rhythm.   Pulmonary:      Effort: Pulmonary effort is normal. No respiratory distress.      Breath sounds: No stridor. Wheezing present. No rhonchi.   Musculoskeletal:         General: Normal range of motion.      Cervical back: Normal range of motion.   Lymphadenopathy:      Cervical: No cervical adenopathy.   Skin:     General: Skin is warm and dry.      Capillary Refill: Capillary refill takes less than 2 seconds.   Neurological:      General: No focal deficit present.      Mental Status: She is alert.            DIAGNOSTICS  Results for orders placed or performed in visit on 08/06/24   XR Chest 2 Views     Status: None    Narrative    Exam:  XR CHEST 2 VIEWS    HISTORY: Acute cough.    COMPARISON:  8/22/2018    FINDINGS:     The cardiomediastinal contours are normal.      No focal consolidation, effusion, or pneumothorax.      No acute osseous abnormality.       Impression    IMPRESSION:      No acute cardiopulmonary process.      FERMÍN MIGUEL MD         SYSTEM ID:  [id number]   Symptomatic Influenza A/B, RSV, & SARS-CoV2 PCR (COVID-19) Nose     Status: Normal    Specimen: Nose; Swab   Result Value Ref Range    Influenza A PCR Negative Negative    Influenza B PCR Negative Negative    RSV PCR Negative Negative    SARS CoV2 PCR Negative Negative    Narrative    Testing was performed using the Xpert Xpress CoV2/Flu/RSV Assay on the [employer] GeneXpert Instrument. This test should be ordered for the detection of SARS-CoV-2, influenza, and RSV viruses in individuals who meet clinical and/or epidemiological criteria. Test performance is unknown in asymptomatic patients. This test is for in vitro diagnostic use under the FDA EUA for laboratories certified under CLIA to  perform high or moderate complexity testing. This test has not been FDA cleared or approved. A negative result does not rule out the presence of PCR inhibitors in the specimen or target RNA in concentration below the limit of detection for the assay. If only one viral target is positive but coinfection with multiple targets is suspected, the sample should be re-tested with another FDA cleared, approved, or authorized test, if coinfection would change clinical management. This test was validated by the [facility]. These laboratories are certified under the Clinical Laboratory Improvement Amendments of 1988 (CLIA-88) as qualified to perform high complexity laboratory testing.          Answers submitted by the patient for this visit:  Patient Health Questionnaire (Submitted on 8/6/2024)  If you checked off any problems, how difficult have these problems made it for you to do your work, take care of things at home, or get along with other people?: Not difficult at all  PHQ9 TOTAL SCORE: 0

## 2024-08-14 NOTE — PROGRESS NOTES
"  Assessment & Plan     1. Paronychia of toe, left  2. Ingrown toenail of right foot  Exam consistent with mild paronychia secondary to ingrown infection. Prescription for Bactrim to cover. Encourage continued symptomatic management. If symptoms persist may need to consider partial toenail removal.    - sulfamethoxazole-trimethoprim (BACTRIM DS) 800-160 MG tablet; Take 1 tablet by mouth 2 times daily  Dispense: 14 tablet; Refill: 0    3. Need for Tdap vaccination  - TDAP 7+ (ADACEL,BOOSTRIX)    4. Healthcare maintenance  Labs stable.   - CBC and Differential; Future  - Comprehensive Metabolic Panel; Future  - CBC and Differential  - Comprehensive Metabolic Panel      BMI  Estimated body mass index is 28.79 kg/m  as calculated from the following:    Height as of this encounter: 1.651 m (5' 5\").    Weight as of this encounter: 78.5 kg (173 lb).           No follow-ups on file.    Isidro Feliz is a 34 year old, presenting for the following health issues:  Nail Problem    History of Present Illness       Reason for visit:  Ingrown toenail, follow up for bronchitis  Symptom onset:  3-4 weeks ago  Symptoms include:  Pain in big toe, had pus coming out of it but cleared up  Symptom intensity:  Moderate  Symptom progression:  Staying the same  Had these symptoms before:  No  What makes it worse:  Wearing real shoes  What makes it better:  No    She eats 0-1 servings of fruits and vegetables daily.She consumes 1 sweetened beverage(s) daily.She exercises with enough effort to increase her heart rate 10 to 19 minutes per day.  She exercises with enough effort to increase her heart rate 4 days per week.   She is taking medications regularly.     Ingrown toenail to right great toe X 1 month. Painful, red. Had some pus like drainage a few days ago that has improved. Wears steel toed boots, stands on concrete floors for 10 hours a day.     Still not feeling at baseline since recent URI. Requesting basic labs for follow up. " Due to Tdap.     PAST MEDICAL HISTORY:   Past Medical History:   Diagnosis Date    Personal history of other mental and behavioral disorders     No Comments Provided    Personal history of urinary infection     No Comments Provided       PAST SURGICAL HISTORY:   Past Surgical History:   Procedure Laterality Date    APPENDECTOMY OPEN      09/05,Open, [facility]    ESOPHAGOSCOPY, GASTROSCOPY, DUODENOSCOPY (EGD), COMBINED      02/23/2012,by Dr. Jama. bile reflux and gastropathy       FAMILY HISTORY:   Family History   Problem Relation Age of Onset    Diabetes Mother         Diabetes    Other - See Comments Mother         Psychiatric illness,depression    Myocardial Infarction Father        SOCIAL HISTORY:   Social History     Tobacco Use    Smoking status: Every Day     Current packs/day: 1.00     Average packs/day: 1 pack/day for 22.6 years (22.6 ttl pk-yrs)     Types: Cigarettes     Start date: 2002    Smokeless tobacco: Never   Substance Use Topics    Alcohol use: Yes     Comment: Alcoholic Drinks/day: Rarely / maybe a couple a month        Allergies   Allergen Reactions    Amoxicillin Rash     Current Outpatient Medications   Medication Sig Dispense Refill    albuterol (PROAIR HFA/PROVENTIL HFA/VENTOLIN HFA) 108 (90 Base) MCG/ACT inhaler Inhale 2 puffs into the lungs every 6 hours as needed for shortness of breath, wheezing or cough 18 g 0    buPROPion (WELLBUTRIN XL) 300 MG 24 hr tablet Take 300 mg by mouth every morning      ibuprofen (ADVIL/MOTRIN) 200 MG tablet Take 600 mg by mouth every 4 hours as needed for pain      propranolol (INDERAL) 10 MG tablet TAKE 1 TO 3 TABLETS BY MOUTH UP TO 2 TIMES DAILY AS NEEDED FOR ANXIETY (4 TO 6 HOURS BETWEEN DOSES). MONITOR FOR SIGNS/SYMPTOMS OF LOW BLOOD PRESSURE.      sulfamethoxazole-trimethoprim (BACTRIM DS) 800-160 MG tablet Take 1 tablet by mouth 2 times daily 14 tablet 0    traZODone (DESYREL) 100 MG tablet Take 100 mg by mouth at bedtime      ARIPiprazole (ABILIFY) 5 MG  "tablet Take 5 mg by mouth daily (Patient not taking: Reported on 8/6/2024)      busPIRone (BUSPAR) 10 MG tablet TAKE 1 TABLET BY MOUTH 3 TIMES DAILY (Patient not taking: Reported on 8/6/2024) 270 tablet 1    citalopram (CELEXA) 40 MG tablet Take 1 tablet (40 mg) by mouth daily (Patient not taking: Reported on 8/6/2024) 90 tablet 3     No current facility-administered medications for this visit.           Objective    /72   Pulse 79   Temp (!) 96.2  F (35.7  C)   Resp 14   Ht 1.651 m (5' 5\")   Wt 78.5 kg (173 lb)   LMP 07/30/2024 (Approximate)   SpO2 96%   BMI 28.79 kg/m    Body mass index is 28.79 kg/m .  Physical Exam   General: Pleasant, in no apparent distress.  Skin: Erythema and warmth to medial aspect of right great toe, no palpable fluctuance or induration.   Psych: Appropriate mood and affect.      Signed Electronically by: Lakia Erazo PA-C    "

## 2024-08-16 ENCOUNTER — OFFICE VISIT (OUTPATIENT)
Dept: FAMILY MEDICINE | Facility: OTHER | Age: 34
End: 2024-08-16
Attending: PHYSICIAN ASSISTANT
Payer: COMMERCIAL

## 2024-08-16 VITALS
OXYGEN SATURATION: 96 % | RESPIRATION RATE: 14 BRPM | WEIGHT: 173 LBS | HEIGHT: 65 IN | HEART RATE: 79 BPM | TEMPERATURE: 96.2 F | DIASTOLIC BLOOD PRESSURE: 72 MMHG | BODY MASS INDEX: 28.82 KG/M2 | SYSTOLIC BLOOD PRESSURE: 128 MMHG

## 2024-08-16 DIAGNOSIS — Z00.00 HEALTHCARE MAINTENANCE: ICD-10-CM

## 2024-08-16 DIAGNOSIS — L03.032 PARONYCHIA OF TOE, LEFT: Primary | ICD-10-CM

## 2024-08-16 DIAGNOSIS — L60.0 INGROWN TOENAIL OF RIGHT FOOT: ICD-10-CM

## 2024-08-16 DIAGNOSIS — Z23 NEED FOR TDAP VACCINATION: ICD-10-CM

## 2024-08-16 LAB
ALBUMIN SERPL BCG-MCNC: 4.4 G/DL (ref 3.5–5.2)
ALP SERPL-CCNC: 104 U/L (ref 40–150)
ALT SERPL W P-5'-P-CCNC: 13 U/L (ref 0–50)
ANION GAP SERPL CALCULATED.3IONS-SCNC: 10 MMOL/L (ref 7–15)
AST SERPL W P-5'-P-CCNC: 20 U/L (ref 0–45)
BASOPHILS # BLD AUTO: 0.1 10E3/UL (ref 0–0.2)
BASOPHILS NFR BLD AUTO: 1 %
BILIRUB SERPL-MCNC: 0.6 MG/DL
BUN SERPL-MCNC: 10.1 MG/DL (ref 6–20)
CALCIUM SERPL-MCNC: 9.4 MG/DL (ref 8.8–10.4)
CHLORIDE SERPL-SCNC: 105 MMOL/L (ref 98–107)
CREAT SERPL-MCNC: 0.99 MG/DL (ref 0.51–0.95)
EGFRCR SERPLBLD CKD-EPI 2021: 75 ML/MIN/1.73M2
EOSINOPHIL # BLD AUTO: 0.1 10E3/UL (ref 0–0.7)
EOSINOPHIL NFR BLD AUTO: 1 %
ERYTHROCYTE [DISTWIDTH] IN BLOOD BY AUTOMATED COUNT: 11.9 % (ref 10–15)
GLUCOSE SERPL-MCNC: 98 MG/DL (ref 70–99)
HCO3 SERPL-SCNC: 24 MMOL/L (ref 22–29)
HCT VFR BLD AUTO: 42.1 % (ref 35–47)
HGB BLD-MCNC: 14.6 G/DL (ref 11.7–15.7)
IMM GRANULOCYTES # BLD: 0 10E3/UL
IMM GRANULOCYTES NFR BLD: 0 %
LYMPHOCYTES # BLD AUTO: 2.5 10E3/UL (ref 0.8–5.3)
LYMPHOCYTES NFR BLD AUTO: 26 %
MCH RBC QN AUTO: 32.3 PG (ref 26.5–33)
MCHC RBC AUTO-ENTMCNC: 34.7 G/DL (ref 31.5–36.5)
MCV RBC AUTO: 93 FL (ref 78–100)
MONOCYTES # BLD AUTO: 0.5 10E3/UL (ref 0–1.3)
MONOCYTES NFR BLD AUTO: 6 %
NEUTROPHILS # BLD AUTO: 6.3 10E3/UL (ref 1.6–8.3)
NEUTROPHILS NFR BLD AUTO: 66 %
NRBC # BLD AUTO: 0 10E3/UL
NRBC BLD AUTO-RTO: 0 /100
PLATELET # BLD AUTO: 321 10E3/UL (ref 150–450)
POTASSIUM SERPL-SCNC: 4.2 MMOL/L (ref 3.4–5.3)
PROT SERPL-MCNC: 6.9 G/DL (ref 6.4–8.3)
RBC # BLD AUTO: 4.52 10E6/UL (ref 3.8–5.2)
SODIUM SERPL-SCNC: 139 MMOL/L (ref 135–145)
WBC # BLD AUTO: 9.5 10E3/UL (ref 4–11)

## 2024-08-16 PROCEDURE — 80053 COMPREHEN METABOLIC PANEL: CPT | Mod: ZL | Performed by: PHYSICIAN ASSISTANT

## 2024-08-16 PROCEDURE — 90715 TDAP VACCINE 7 YRS/> IM: CPT | Performed by: PHYSICIAN ASSISTANT

## 2024-08-16 PROCEDURE — 85025 COMPLETE CBC W/AUTO DIFF WBC: CPT | Mod: ZL | Performed by: PHYSICIAN ASSISTANT

## 2024-08-16 PROCEDURE — 90471 IMMUNIZATION ADMIN: CPT | Performed by: PHYSICIAN ASSISTANT

## 2024-08-16 PROCEDURE — 99213 OFFICE O/P EST LOW 20 MIN: CPT | Mod: 25 | Performed by: PHYSICIAN ASSISTANT

## 2024-08-16 PROCEDURE — 36415 COLL VENOUS BLD VENIPUNCTURE: CPT | Mod: ZL | Performed by: PHYSICIAN ASSISTANT

## 2024-08-16 RX ORDER — SULFAMETHOXAZOLE/TRIMETHOPRIM 800-160 MG
1 TABLET ORAL 2 TIMES DAILY
Qty: 14 TABLET | Refills: 0 | Status: SHIPPED | OUTPATIENT
Start: 2024-08-16

## 2024-08-16 ASSESSMENT — PAIN SCALES - GENERAL: PAINLEVEL: MILD PAIN (2)

## 2024-08-16 ASSESSMENT — PATIENT HEALTH QUESTIONNAIRE - PHQ9
SUM OF ALL RESPONSES TO PHQ QUESTIONS 1-9: 3
SUM OF ALL RESPONSES TO PHQ QUESTIONS 1-9: 3
10. IF YOU CHECKED OFF ANY PROBLEMS, HOW DIFFICULT HAVE THESE PROBLEMS MADE IT FOR YOU TO DO YOUR WORK, TAKE CARE OF THINGS AT HOME, OR GET ALONG WITH OTHER PEOPLE: NOT DIFFICULT AT ALL

## 2024-11-30 ENCOUNTER — HEALTH MAINTENANCE LETTER (OUTPATIENT)
Age: 34
End: 2024-11-30

## 2024-12-26 ENCOUNTER — OFFICE VISIT (OUTPATIENT)
Dept: FAMILY MEDICINE | Facility: OTHER | Age: 34
End: 2024-12-26
Attending: PHYSICIAN ASSISTANT
Payer: COMMERCIAL

## 2024-12-26 VITALS
WEIGHT: 170.2 LBS | TEMPERATURE: 97.9 F | OXYGEN SATURATION: 99 % | HEART RATE: 99 BPM | BODY MASS INDEX: 28.32 KG/M2 | SYSTOLIC BLOOD PRESSURE: 137 MMHG | DIASTOLIC BLOOD PRESSURE: 68 MMHG

## 2024-12-26 DIAGNOSIS — L60.0 INGROWN RIGHT BIG TOENAIL: Primary | ICD-10-CM

## 2024-12-26 NOTE — PROGRESS NOTES
Assessment & Plan   Problem List Items Addressed This Visit    None  Visit Diagnoses       Ingrown right big toenail    -  Primary    Relevant Orders    REMOVAL NAIL/NAIL BED, PARTIAL OR COMPLETE (Completed)           Right big ingrown toenail:  Completed medial right big toenail partial removal.  Patient tolerated the procedure well.  No complications were appreciated.  Gave infection warning signs and symptoms.  Encouraged close follow-up if symptoms are changing or worsening.    Patient Instructions   Encouraged to soak toe in warm water with antibacterial soap 10-15 minutes at a time a few times per day.   Return in a few days if redness or pain is worsening as we may need to drain the infection. Please return to clinic if symptoms change/worsen. Can take tylenol as needed for pain. Encouraged to not cut toenails too short and to cut them straight across.        See Patient Instructions    Return if symptoms worsen or fail to improve.      Isidro Feliz is a 34 year old, presenting for the following health issues:  Nail Problem        12/26/2024     2:19 PM   Additional Questions   Roomed by Kandace DENNEY     Patient's right great medial toe is sore next to the distal nailbed.  It is annoying.  Has been present for a few months.  Her toenail grows downwards.  History of toenail infections in the past.  No current infection.  Interested in a partial toenail removal.  No fevers or chills.    Review of Systems  Constitutional, HEENT, cardiovascular, pulmonary, gi and gu systems are negative, except as otherwise noted.      Objective    /68   Pulse 99   Temp 97.9  F (36.6  C) (Tympanic)   Wt 77.2 kg (170 lb 3.2 oz)   LMP 11/29/2024 (Approximate)   SpO2 99%   BMI 28.32 kg/m    Body mass index is 28.32 kg/m .  Physical Exam  Vitals and nursing note reviewed.   Constitutional:       Appearance: Normal appearance.   HENT:      Head: Normocephalic and atraumatic.   Musculoskeletal:          General: Normal range of motion.      Cervical back: Normal range of motion.   Skin:     General: Skin is warm and dry.      Findings: No rash.      Comments: Right medial distal great toe next to the nailbed is mildly tender to palpation.  Nail is curved downwards into the skin.  No surrounding erythema, pus, drainage, warmth.   Neurological:      General: No focal deficit present.      Mental Status: She is alert and oriented to person, place, and time.   Psychiatric:         Mood and Affect: Mood normal.         Behavior: Behavior normal.        Procedural note:   Options are discussed and the patient has elected to have medial portion of the right great toenail removed. Time out was called.  Patient was prepped and draped in a proper sterile manner.  Under sterile conditions a digit block was performed with 1% Lidocaine without epi. Medial portion of the right great toenail was removed. Hemostasis was achieved with direct pressure. Bacitracin and sterile guaze was then applied. The patient tolerated the procedure well.  No complications were appreciated.    No results found for any visits on 12/26/24.        Signed Electronically by: Aubrie Ball PA-C

## 2024-12-26 NOTE — PATIENT INSTRUCTIONS
Encouraged to soak toe in warm water with antibacterial soap 10-15 minutes at a time a few times per day.   Return in a few days if redness or pain is worsening as we may need to drain the infection. Please return to clinic if symptoms change/worsen. Can take tylenol as needed for pain. Encouraged to not cut toenails too short and to cut them straight across.

## 2024-12-26 NOTE — NURSING NOTE
"Chief Complaint   Patient presents with    Nail Problem     Patient was seen 12/20/2024 and saw Lakia Erazo. She has ingrown R big toenail. She would like it cut.    Initial /68   Pulse 99   Temp 97.9  F (36.6  C) (Tympanic)   Wt 77.2 kg (170 lb 3.2 oz)   LMP 11/29/2024 (Approximate)   SpO2 99%   BMI 28.32 kg/m   Estimated body mass index is 28.32 kg/m  as calculated from the following:    Height as of 12/20/24: 1.651 m (5' 5\").    Weight as of this encounter: 77.2 kg (170 lb 3.2 oz).  Medication Review: complete    The next two questions are to help us understand your food security.  If you are feeling you need any assistance in this area, we have resources available to support you today.          12/17/2024   SDOH- Food Insecurity   Within the past 12 months, did you worry that your food would run out before you got money to buy more? N   Within the past 12 months, did the food you bought just not last and you didn t have money to get more? N         Health Care Directive:  Patient does not have a Health Care Directive: Discussed advance care planning with patient; however, patient declined at this time.    Kandace Colvin MA      "

## 2025-02-27 ENCOUNTER — MYC MEDICAL ADVICE (OUTPATIENT)
Dept: FAMILY MEDICINE | Facility: OTHER | Age: 35
End: 2025-02-27
Payer: COMMERCIAL

## 2025-02-27 DIAGNOSIS — K04.7 DENTAL INFECTION: Primary | ICD-10-CM

## 2025-02-27 RX ORDER — CLINDAMYCIN HYDROCHLORIDE 300 MG/1
300 CAPSULE ORAL 3 TIMES DAILY
Qty: 21 CAPSULE | Refills: 0 | Status: SHIPPED | OUTPATIENT
Start: 2025-02-27

## 2025-02-27 NOTE — TELEPHONE ENCOUNTER
Has infected tooth that is getting pulled in the future. Dentist is out. Wondering if she can get an abx?    LOV 12/20    Would you like an evisit for this?     Routing to provider to review and respond.  Tita Gomez RN on 2/27/2025 at 8:42 AM

## 2025-05-28 NOTE — PATIENT INSTRUCTIONS
Will draw routine labwork- will MyChart you with results- will consider ordering an ultrasound for further workup and referral to GYN if you continue to have symptoms.   
intact

## 2025-05-30 ENCOUNTER — APPOINTMENT (OUTPATIENT)
Dept: GENERAL RADIOLOGY | Facility: OTHER | Age: 35
End: 2025-05-30
Attending: STUDENT IN AN ORGANIZED HEALTH CARE EDUCATION/TRAINING PROGRAM
Payer: COMMERCIAL

## 2025-05-30 ENCOUNTER — HOSPITAL ENCOUNTER (EMERGENCY)
Facility: OTHER | Age: 35
Discharge: HOME | End: 2025-05-30
Attending: STUDENT IN AN ORGANIZED HEALTH CARE EDUCATION/TRAINING PROGRAM
Payer: COMMERCIAL

## 2025-05-30 VITALS
HEIGHT: 66 IN | SYSTOLIC BLOOD PRESSURE: 126 MMHG | DIASTOLIC BLOOD PRESSURE: 65 MMHG | RESPIRATION RATE: 14 BRPM | TEMPERATURE: 97.5 F | WEIGHT: 168 LBS | OXYGEN SATURATION: 99 % | HEART RATE: 63 BPM | BODY MASS INDEX: 27 KG/M2

## 2025-05-30 DIAGNOSIS — R55 SYNCOPE, UNSPECIFIED SYNCOPE TYPE: ICD-10-CM

## 2025-05-30 LAB
ALBUMIN SERPL BCG-MCNC: 4.1 G/DL (ref 3.5–5.2)
ALBUMIN UR-MCNC: NEGATIVE MG/DL
ALP SERPL-CCNC: 105 U/L (ref 40–150)
ALT SERPL W P-5'-P-CCNC: 13 U/L (ref 0–50)
ANION GAP SERPL CALCULATED.3IONS-SCNC: 13 MMOL/L (ref 7–15)
APPEARANCE UR: CLEAR
AST SERPL W P-5'-P-CCNC: 24 U/L (ref 0–45)
BASOPHILS # BLD AUTO: 0.1 10E3/UL (ref 0–0.2)
BASOPHILS NFR BLD AUTO: 1 %
BILIRUB SERPL-MCNC: 0.6 MG/DL
BILIRUB UR QL STRIP: NEGATIVE
BUN SERPL-MCNC: 10.6 MG/DL (ref 6–20)
CALCIUM SERPL-MCNC: 8.5 MG/DL (ref 8.8–10.4)
CHLORIDE SERPL-SCNC: 103 MMOL/L (ref 98–107)
COLOR UR AUTO: NORMAL
CREAT SERPL-MCNC: 0.89 MG/DL (ref 0.51–0.95)
EGFRCR SERPLBLD CKD-EPI 2021: 85 ML/MIN/1.73M2
EOSINOPHIL # BLD AUTO: 0.1 10E3/UL (ref 0–0.7)
EOSINOPHIL NFR BLD AUTO: 2 %
ERYTHROCYTE [DISTWIDTH] IN BLOOD BY AUTOMATED COUNT: 11.9 % (ref 10–15)
GLUCOSE SERPL-MCNC: 75 MG/DL (ref 70–99)
GLUCOSE UR STRIP-MCNC: NEGATIVE MG/DL
HCG UR QL: NEGATIVE
HCO3 SERPL-SCNC: 22 MMOL/L (ref 22–29)
HCT VFR BLD AUTO: 38 % (ref 35–47)
HGB BLD-MCNC: 12.9 G/DL (ref 11.7–15.7)
HGB UR QL STRIP: NEGATIVE
HOLD SPECIMEN: NORMAL
IMM GRANULOCYTES # BLD: 0 10E3/UL
IMM GRANULOCYTES NFR BLD: 0 %
KETONES UR STRIP-MCNC: NEGATIVE MG/DL
LEUKOCYTE ESTERASE UR QL STRIP: NEGATIVE
LIPASE SERPL-CCNC: 33 U/L (ref 13–60)
LYMPHOCYTES # BLD AUTO: 3.1 10E3/UL (ref 0.8–5.3)
LYMPHOCYTES NFR BLD AUTO: 33 %
MCH RBC QN AUTO: 32.1 PG (ref 26.5–33)
MCHC RBC AUTO-ENTMCNC: 33.9 G/DL (ref 31.5–36.5)
MCV RBC AUTO: 95 FL (ref 78–100)
MONOCYTES # BLD AUTO: 0.7 10E3/UL (ref 0–1.3)
MONOCYTES NFR BLD AUTO: 8 %
NEUTROPHILS # BLD AUTO: 5.2 10E3/UL (ref 1.6–8.3)
NEUTROPHILS NFR BLD AUTO: 56 %
NITRATE UR QL: NEGATIVE
NRBC # BLD AUTO: 0 10E3/UL
NRBC BLD AUTO-RTO: 0 /100
PH UR STRIP: 5.5 [PH] (ref 5–9)
PLATELET # BLD AUTO: 303 10E3/UL (ref 150–450)
POTASSIUM SERPL-SCNC: 3.6 MMOL/L (ref 3.4–5.3)
PROT SERPL-MCNC: 6.5 G/DL (ref 6.4–8.3)
RBC # BLD AUTO: 4.02 10E6/UL (ref 3.8–5.2)
SODIUM SERPL-SCNC: 138 MMOL/L (ref 135–145)
SP GR UR STRIP: 1.01 (ref 1–1.03)
TSH SERPL DL<=0.005 MIU/L-ACNC: 2.62 UIU/ML (ref 0.3–4.2)
UROBILINOGEN UR STRIP-MCNC: NORMAL MG/DL
WBC # BLD AUTO: 9.4 10E3/UL (ref 4–11)

## 2025-05-30 PROCEDURE — 84443 ASSAY THYROID STIM HORMONE: CPT | Performed by: STUDENT IN AN ORGANIZED HEALTH CARE EDUCATION/TRAINING PROGRAM

## 2025-05-30 PROCEDURE — 258N000003 HC RX IP 258 OP 636: Performed by: STUDENT IN AN ORGANIZED HEALTH CARE EDUCATION/TRAINING PROGRAM

## 2025-05-30 PROCEDURE — 85014 HEMATOCRIT: CPT | Performed by: STUDENT IN AN ORGANIZED HEALTH CARE EDUCATION/TRAINING PROGRAM

## 2025-05-30 PROCEDURE — 36415 COLL VENOUS BLD VENIPUNCTURE: CPT | Performed by: STUDENT IN AN ORGANIZED HEALTH CARE EDUCATION/TRAINING PROGRAM

## 2025-05-30 PROCEDURE — 86141 C-REACTIVE PROTEIN HS: CPT | Performed by: STUDENT IN AN ORGANIZED HEALTH CARE EDUCATION/TRAINING PROGRAM

## 2025-05-30 PROCEDURE — 71045 X-RAY EXAM CHEST 1 VIEW: CPT

## 2025-05-30 PROCEDURE — 81003 URINALYSIS AUTO W/O SCOPE: CPT | Performed by: STUDENT IN AN ORGANIZED HEALTH CARE EDUCATION/TRAINING PROGRAM

## 2025-05-30 PROCEDURE — 81025 URINE PREGNANCY TEST: CPT | Performed by: STUDENT IN AN ORGANIZED HEALTH CARE EDUCATION/TRAINING PROGRAM

## 2025-05-30 PROCEDURE — 83690 ASSAY OF LIPASE: CPT | Performed by: STUDENT IN AN ORGANIZED HEALTH CARE EDUCATION/TRAINING PROGRAM

## 2025-05-30 PROCEDURE — 82040 ASSAY OF SERUM ALBUMIN: CPT | Performed by: STUDENT IN AN ORGANIZED HEALTH CARE EDUCATION/TRAINING PROGRAM

## 2025-05-30 PROCEDURE — 84132 ASSAY OF SERUM POTASSIUM: CPT | Performed by: STUDENT IN AN ORGANIZED HEALTH CARE EDUCATION/TRAINING PROGRAM

## 2025-05-30 PROCEDURE — 93005 ELECTROCARDIOGRAM TRACING: CPT | Performed by: STUDENT IN AN ORGANIZED HEALTH CARE EDUCATION/TRAINING PROGRAM

## 2025-05-30 PROCEDURE — 99284 EMERGENCY DEPT VISIT MOD MDM: CPT | Performed by: STUDENT IN AN ORGANIZED HEALTH CARE EDUCATION/TRAINING PROGRAM

## 2025-05-30 PROCEDURE — 93010 ELECTROCARDIOGRAM REPORT: CPT | Performed by: INTERNAL MEDICINE

## 2025-05-30 PROCEDURE — 96360 HYDRATION IV INFUSION INIT: CPT | Performed by: STUDENT IN AN ORGANIZED HEALTH CARE EDUCATION/TRAINING PROGRAM

## 2025-05-30 PROCEDURE — 71045 X-RAY EXAM CHEST 1 VIEW: CPT | Mod: 26 | Performed by: RADIOLOGY

## 2025-05-30 PROCEDURE — 99285 EMERGENCY DEPT VISIT HI MDM: CPT | Mod: 25 | Performed by: STUDENT IN AN ORGANIZED HEALTH CARE EDUCATION/TRAINING PROGRAM

## 2025-05-30 PROCEDURE — 85049 AUTOMATED PLATELET COUNT: CPT | Performed by: STUDENT IN AN ORGANIZED HEALTH CARE EDUCATION/TRAINING PROGRAM

## 2025-05-30 RX ADMIN — SODIUM CHLORIDE 1000 ML: 9 INJECTION, SOLUTION INTRAVENOUS at 19:50

## 2025-05-30 ASSESSMENT — ACTIVITIES OF DAILY LIVING (ADL)
ADLS_ACUITY_SCORE: 41

## 2025-05-30 ASSESSMENT — COLUMBIA-SUICIDE SEVERITY RATING SCALE - C-SSRS
1. IN THE PAST MONTH, HAVE YOU WISHED YOU WERE DEAD OR WISHED YOU COULD GO TO SLEEP AND NOT WAKE UP?: NO
6. HAVE YOU EVER DONE ANYTHING, STARTED TO DO ANYTHING, OR PREPARED TO DO ANYTHING TO END YOUR LIFE?: NO
2. HAVE YOU ACTUALLY HAD ANY THOUGHTS OF KILLING YOURSELF IN THE PAST MONTH?: NO

## 2025-05-31 LAB — CRP SERPL HS-MCNC: 0.25 MG/L

## 2025-05-31 NOTE — ED TRIAGE NOTES
"Pt arrives via [facility] from [facility] [place].  Pt states she had one drink and soon after had a syncopal episode.  Pt reports feeling \"off\" for the past week.  Pt has a small abrasion on her right knee.  Pt reports intermittent right flank pain, but not painful upon arrival.  EMS VS-BP-11/73, HR-70's, RR-18, BG-84.  EMS gave a 500ml bolus of NS.  Pt is A/O upon arrival.          "

## 2025-05-31 NOTE — ED PROVIDER NOTES
"ED PROVIDER NOTE  Patient Name: Tray Talavera  MRN: [id number]    CC:    Chief Complaint   Patient presents with    Syncope    Flank Pain         MDM  35 year old female presenting with syncope and flank pain.      In the ED, /80   Pulse 66   Temp 97.5  F (36.4  C) (Tympanic)   Resp 14   Ht 1.664 m (5' 5.5\")   Wt 76.2 kg (168 lb)   LMP 05/21/2025   SpO2 100%   BMI 27.53 kg/m      Physical exam revealed clear auscultation bilaterally.  Benign abdominal exam.  Grossly neurologically intact.  In no acute distress, no accessory muscle use.  Overall does not look critically ill or toxic.      I have independently reviewed and interpreted the patients test results which I have ordered this visit which are the following:      ED Course as of 05/30/25 2157   Fri May 30, 2025   1916 No fever or hills.    No ur   1916 No urinary symptoms diarrhea vomiting.     1917 Not pregnant    1917 No chest pain or shorntess of breath.     1917 Father had a heart attack s/p stent 2019.       1925 WBC: 9.4   1925 Hemoglobin: 12.9   1941 Sodium: 138   1941 Potassium: 3.6   1941 Lipase: 33   1941 Urea Nitrogen: 10.6   1941 Creatinine: 0.89  EKG reveals sinus rhythm, ventricular rate of 69, QTc 435.  This was interpreted by me.   1952 TSH: 2.62   2015 XR Chest Port 1 View  IMPRESSION: Negative chest.   2138 HCG Qual Urine: Negative         Right-sided flank pain  Syncope  Hx of tobacco use  Patient presents for an episode of syncope, after drinking alcohol.  Stated that her legs felt weak and she developed some dull pain in the right flank before likely passing out.  She was able to be cashed with someone she was with, they were holding hands so he was very close.  Head contusion, no headache, vision changes nausea or vomiting.  No focal deficits.  Patient states that prior to come to the emergency department she had significant improvement but would have like to get checked out.  EKG was unremarkable, laboratory results were " reassuring, TSH, white blood cell count and hemoglobin within normal.  Urinalysis clear, no signs of any significant electrolyte abnormalities, calcium mildly low at 8.5.  UPT negative.  Regarding her flank pain, unclear.  No signs of continued pain here in the emerged part or requiring pain meds.  In addition there is no hematuria, given her lack of pain now I do not believe it pertinent at this moment to conduct imaging.  Ultimately patient was discharged and told to return if symptoms worsen, no compelling evidence to require any advanced imaging at this time or admission.    - Normal saline 1 L  - The patient was advised to follow up with PCP in 2-3 days and to return to the ED if symptoms worsened, or if there were any other urgent or life-threatening concerns.  - Following counseling on the work-up, results, diagnosis, and treatment plan, the patient was amenable to discharge after all questions were answered. The patient expressed agreement and understanding to the plan.      Clinical impression  Syncope    Disposition  Home      ОЛЕГ Talavera is a 35 year old female with PMH of adjustment disorder who presents with syncope.     History of obtained by: Patient, mother, father    Approximate 2 hours prior arrival patient was at the bar drinking a seltzer when she had suddenly felt right-sided flank pain, lower extremity weakness, and lightheadedness without vertiginous symptoms, and syncopized.  She has noticed some dull mild pain in the right flank for the last week.  It is not severe, does not radiate patient has any urinary symptoms.  Per the fall she does remember her legs feeling weak, she had felt well prior to drinking alcohol.  But she does note that she had to drink alcohol last week and felt similar unwellness symptoms that are quite vague.  Denies any fevers, chills, myalgias, diarrhea, nausea or vomiting.  She does not believe she is pregnant.  She has no palpitations, chest pain or  shortness of breath.  Family history of coronary artery disease, but no history of dysrhythmia as far as mother knows.  Smokes 1 pack of cigarettes per day per    PMH and SH reviewed.    10 point ROS reviewed and negative except as stated in HPI.    Past medical history:  Past Medical History:   Diagnosis Date    Personal history of other mental and behavioral disorders     No Comments Provided    Personal history of urinary infection     No Comments Provided       Social history:  Social Connections: Unknown (12/17/2024)    Social Connection and Isolation Panel [NHANES]     Frequency of Communication with Friends and Family: Not on file     Frequency of Social Gatherings with Friends and Family: Twice a week     Attends Episcopal Services: Not on file     Active Member of Clubs or Organizations: Not on file     Attends Club or Organization Meetings: Not on file     Marital Status: Not on file     Social History     Socioeconomic History    Marital status: Single   Occupational History     Employer: [employer]    Tobacco Use    Smoking status: Every Day     Current packs/day: 1.00     Average packs/day: 1 pack/day for 23.4 years (23.4 ttl pk-yrs)     Types: Cigarettes     Start date: 2002    Smokeless tobacco: Never   Vaping Use    Vaping status: Never Used   Substance and Sexual Activity    Alcohol use: Yes     Comment: Alcoholic Drinks/day: Rarely / maybe a couple a month    Drug use: No    Sexual activity: Not Currently     Partners: Male   Social History Narrative    single.     Patient currently smokes, 1 ppd.     Alcohol Use - yes, 1-5 drinks per week    single. currently unemployed     Social Drivers of Health     Financial Resource Strain: Low Risk  (12/17/2024)    Financial Resource Strain     Within the past 12 months, have you or your family members you live with been unable to get utilities (heat, electricity) when it was really needed?: No   Food Insecurity: Low Risk  (12/17/2024)    Food Insecurity      Within the past 12 months, did you worry that your food would run out before you got money to buy more?: No     Within the past 12 months, did the food you bought just not last and you didn t have money to get more?: No   Transportation Needs: Low Risk  (12/17/2024)    Transportation Needs     Within the past 12 months, has lack of transportation kept you from medical appointments, getting your medicines, non-medical meetings or appointments, work, or from getting things that you need?: No   Physical Activity: Sufficiently Active (12/17/2024)    Exercise Vital Sign     Days of Exercise per Week: 4 days     Minutes of Exercise per Session: 40 min   Stress: No Stress Concern Present (12/17/2024)    [employer] [place] [employer] - Occupational Stress Questionnaire     Feeling of Stress : Only a little   Social Connections: Unknown (12/17/2024)    Social Connection and Isolation Panel [NHANES]     Frequency of Social Gatherings with Friends and Family: Twice a week   Interpersonal Safety: Low Risk  (12/20/2024)    Interpersonal Safety     Do you feel physically and emotionally safe where you currently live?: Yes     Within the past 12 months, have you been hit, slapped, kicked or otherwise physically hurt by someone?: No     Within the past 12 months, have you been humiliated or emotionally abused in other ways by your partner or ex-partner?: No   Housing Stability: Low Risk  (12/17/2024)    Housing Stability     Do you have housing? : Yes     Are you worried about losing your housing?: No         I have reviewed the patients prescribed medications:    Current Facility-Administered Medications:     sodium chloride 0.9% BOLUS 1,000 mL, 1,000 mL, Intravenous, Once, Дмитрий Cardoso MD    Current Outpatient Medications:     albuterol (PROAIR HFA/PROVENTIL HFA/VENTOLIN HFA) 108 (90 Base) MCG/ACT inhaler, Inhale 2 puffs into the lungs every 6 hours as needed for shortness of breath, wheezing or cough, Disp: 18  "g, Rfl: 0    buPROPion (WELLBUTRIN XL) 300 MG 24 hr tablet, Take 300 mg by mouth every morning, Disp: , Rfl:     clindamycin (CLEOCIN) 300 MG capsule, Take 1 capsule (300 mg) by mouth 3 times daily., Disp: 21 capsule, Rfl: 0    ibuprofen (ADVIL/MOTRIN) 200 MG tablet, Take 600 mg by mouth every 4 hours as needed for pain, Disp: , Rfl:     propranolol (INDERAL) 10 MG tablet, TAKE 1 TO 3 TABLETS BY MOUTH UP TO 2 TIMES DAILY AS NEEDED FOR ANXIETY (4 TO 6 HOURS BETWEEN DOSES). MONITOR FOR SIGNS/SYMPTOMS OF LOW BLOOD PRESSURE., Disp: , Rfl:     traZODone (DESYREL) 100 MG tablet, Take 100 mg by mouth at bedtime, Disp: , Rfl:     Allergies:  Allergies   Allergen Reactions    Amoxicillin Rash         Vitals:    05/30/25 1901 05/30/25 1906   BP: 126/80    Pulse: 66    Resp: 14    Temp: 97.5  F (36.4  C)    TempSrc: Tympanic    SpO2: 100%    Weight:  76.2 kg (168 lb)   Height:  1.664 m (5' 5.5\")       Physical Exam  Vitals: /80   Pulse 66   Temp 97.5  F (36.4  C) (Tympanic)   Resp 14   Ht 1.664 m (5' 5.5\")   Wt 76.2 kg (168 lb)   LMP 05/21/2025   SpO2 100%   BMI 27.53 kg/m    Constitutional: awake, NAD  Eyes: No conjunctival injection and normal lids, PERRL, EOMI  ENT: external nose and ears atraumatic  Neck: Symmetric, trachea midline, Supple  CV: RRR, no murmurs appreciated.  Pulm: Unlabored respiratory effort, good air movement, CTAB, no w/c/r appreciated.  GI: Soft, nontender, nondistended.  No rebound or guarding  MSK: No deformities.  No cyanosis.  Neuro: AOx4, normal speech, following commands, grossly symmetric strength in all extremities.  Cranial nerves III-XII grossly intact.    Skin: warm & dry, no rashes or lesions  Psych: Appropriate mood & affect    Past medical history, past surgical history, problem list, family history, social history, medication list, and allergies were reviewed as documented in epic snapshot.  Relevant review of systems are documented within the HPI above.    The below " information is for billing purposes only.  There are examples under each underlined title, that are not necessary reflective of the patient's care or history, but are represent examples of the level of care for billing purposes only.     Complexity of the Problems Addressed  5 (High) - 1 or more chronic illnesses with severe exacerbation, progression, or side effects of treatment or 1 acute chronic illness or injury that poses threat to live or bodily function    Complexity of Data  I have reviewed the following pertinent historical labs, diagnoses, tests, and/or imaging which contribute to complexity of this patient's care.    5 - (Extensive) - (2 of three above)    Complication Risk  Based on my interpretation of the current labs, historical tests and/or external tests. Patient does have a risk level as stated below of morbidity, threat to life, and bodily function, and severe exacerbation if not treated.  5 - High (drug therapy requiring intensive monitoring, elective major surgery with risk factors, emergency major surgery, hospitalization or excalation of hospital level care, decision not to resuscitate or to de-escalate care because of poor prognosis, parenteral controlled substances)          ED Events, Labs and Imaging:  ED Course as of 05/30/25 2157   Fri May 30, 2025   1916 No fever or hills.    No ur   1916 No urinary symptoms diarrhea vomiting.     1917 Not pregnant    1917 No chest pain or shorntess of breath.     1917 Father had a heart attack s/p stent 2019.       1925 WBC: 9.4   1925 Hemoglobin: 12.9   1941 Sodium: 138   1941 Potassium: 3.6   1941 Lipase: 33   1941 Urea Nitrogen: 10.6   1941 Creatinine: 0.89  EKG reveals sinus rhythm, ventricular rate of 69, QTc 435.  This was interpreted by me.   1952 TSH: 2.62   2015 XR Chest Port 1 View  IMPRESSION: Negative chest.   2138 HCG Qual Urine: Negative       Charlie Cardoso MD  Emergency Medicine    Dictation Disclaimer: Some notes are completed with  voice-recognition dictation software. Errors are generally corrected in real time. Please contact me via Epic staff message if you note any errors requiring clarification.     Дмитрий Cardoso MD  05/30/25 2153       Дмитрий Cardoso MD  05/30/25 4144

## 2025-05-31 NOTE — DISCHARGE INSTRUCTIONS
Follow-up with your primary care doctor the next 2 to 3 days.    For mild to moderate pain or fever you can take ibuprofen and/or Tylenol if you do not have allergies to these.    You have any worsening or concerning symptoms please call 911 or return to the emergency department immediately.

## 2025-06-01 LAB
ATRIAL RATE - MUSE: 69 BPM
DIASTOLIC BLOOD PRESSURE - MUSE: NORMAL MMHG
INTERPRETATION ECG - MUSE: NORMAL
P AXIS - MUSE: 74 DEGREES
PR INTERVAL - MUSE: 158 MS
QRS DURATION - MUSE: 92 MS
QT - MUSE: 406 MS
QTC - MUSE: 435 MS
R AXIS - MUSE: 46 DEGREES
SYSTOLIC BLOOD PRESSURE - MUSE: NORMAL MMHG
T AXIS - MUSE: 48 DEGREES
VENTRICULAR RATE- MUSE: 69 BPM

## 2025-06-02 ENCOUNTER — OFFICE VISIT (OUTPATIENT)
Dept: FAMILY MEDICINE | Facility: OTHER | Age: 35
End: 2025-06-02
Attending: PHYSICIAN ASSISTANT
Payer: COMMERCIAL

## 2025-06-02 VITALS — SYSTOLIC BLOOD PRESSURE: 128 MMHG | RESPIRATION RATE: 16 BRPM | DIASTOLIC BLOOD PRESSURE: 68 MMHG

## 2025-06-02 DIAGNOSIS — R10.9 RIGHT FLANK PAIN: Primary | ICD-10-CM

## 2025-06-02 ASSESSMENT — ANXIETY QUESTIONNAIRES
1. FEELING NERVOUS, ANXIOUS, OR ON EDGE: NOT AT ALL
GAD7 TOTAL SCORE: 0
GAD7 TOTAL SCORE: 0
6. BECOMING EASILY ANNOYED OR IRRITABLE: NOT AT ALL
4. TROUBLE RELAXING: NOT AT ALL
GAD7 TOTAL SCORE: 0
5. BEING SO RESTLESS THAT IT IS HARD TO SIT STILL: NOT AT ALL
7. FEELING AFRAID AS IF SOMETHING AWFUL MIGHT HAPPEN: NOT AT ALL
2. NOT BEING ABLE TO STOP OR CONTROL WORRYING: NOT AT ALL
7. FEELING AFRAID AS IF SOMETHING AWFUL MIGHT HAPPEN: NOT AT ALL
8. IF YOU CHECKED OFF ANY PROBLEMS, HOW DIFFICULT HAVE THESE MADE IT FOR YOU TO DO YOUR WORK, TAKE CARE OF THINGS AT HOME, OR GET ALONG WITH OTHER PEOPLE?: NOT DIFFICULT AT ALL
3. WORRYING TOO MUCH ABOUT DIFFERENT THINGS: NOT AT ALL
IF YOU CHECKED OFF ANY PROBLEMS ON THIS QUESTIONNAIRE, HOW DIFFICULT HAVE THESE PROBLEMS MADE IT FOR YOU TO DO YOUR WORK, TAKE CARE OF THINGS AT HOME, OR GET ALONG WITH OTHER PEOPLE: NOT DIFFICULT AT ALL

## 2025-06-02 ASSESSMENT — PATIENT HEALTH QUESTIONNAIRE - PHQ9
10. IF YOU CHECKED OFF ANY PROBLEMS, HOW DIFFICULT HAVE THESE PROBLEMS MADE IT FOR YOU TO DO YOUR WORK, TAKE CARE OF THINGS AT HOME, OR GET ALONG WITH OTHER PEOPLE: SOMEWHAT DIFFICULT
SUM OF ALL RESPONSES TO PHQ QUESTIONS 1-9: 2
SUM OF ALL RESPONSES TO PHQ QUESTIONS 1-9: 2

## 2025-06-02 ASSESSMENT — PAIN SCALES - GENERAL: PAINLEVEL_OUTOF10: MILD PAIN (3)

## 2025-06-02 NOTE — NURSING NOTE
"Chief Complaint   Patient presents with    ER F/U     Syncopal episode 5/30/25, flank pain       Initial Resp 16   LMP 05/21/2025  Estimated body mass index is 27.53 kg/m  as calculated from the following:    Height as of 5/30/25: 1.664 m (5' 5.5\").    Weight as of 5/30/25: 76.2 kg (168 lb).  Medication Review: complete    The next two questions are to help us understand your food security.  If you are feeling you need any assistance in this area, we have resources available to support you today.          12/17/2024   SDOH- Food Insecurity   Within the past 12 months, did you worry that your food would run out before you got money to buy more? N   Within the past 12 months, did the food you bought just not last and you didn t have money to get more? N        Data saved with a previous flowsheet row definition         Health Care Directive:  Patient does not have a Health Care Directive: Discussed advance care planning with patient; however, patient declined at this time.    Jennifer Thomas, [facility]      "

## 2025-06-02 NOTE — PROGRESS NOTES
Assessment & Plan     Right flank pain  Persistent for greater than 1 week with progression in intensity.  Symptoms.  Vitals stable, exam showing right CVA tenderness.  Reviewed lab work showing stable CBC, CMP, negative UA, and urine pregnancy test.  Discussed options and patient is in agreement with imaging, order placed as below.  Continue symptomatic management as needed in the meantime.  If symptoms significantly worsen in the meantime follow-up in clinic or ED for additional evaluation.  - CT Abdomen Pelvis w/o Contrast; Future          Subjective   Tray is a 35 year old, presenting for the following health issues:  ER F/U (Syncopal episode 5/30/25, flank pain)    History of Present Illness       Back Pain:  She presents for follow up of back pain. Patient's back pain is a new problem.    Original cause of back pain: not sure  First noticed back pain: 1-4 weeks ago  Patient feels back pain: comes and goesLocation of back pain:  Right side of waist  Description of back pain: dull ache  Back pain spreads: nowhere    Since patient first noticed back pain, pain is: always present, but gets better and worse  Does back pain interfere with her job:  Yes  On a scale of 1-10 (10 being the worst), patient describes pain as:  5  What makes back pain worse: other   Acupuncture: not tried  Acetaminophen: not helpful  Activity or exercise: not helpful  Chiropractor:  Not tried  Cold: not tried  Heat: not tried  Massage: not tried  Muscle relaxants: not tried  NSAIDS: not tried  Opioids: not tried  Physical Therapy: not tried  Rest: not helpful  Steroid Injection: not tried  Stretching: not tried  Surgery: not tried  TENS unit: not tried  Topical pain relievers: not tried  Other healthcare providers patient is seeing for back pain: None    She eats 0-1 servings of fruits and vegetables daily.She consumes 1 sweetened beverage(s) daily.She exercises with enough effort to increase her heart rate 10 to 19 minutes per day.   She exercises with enough effort to increase her heart rate 3 or less days per week.   She is taking medications regularly.      Here for ED follow-up.  Patient was seen in the ED 5/30 for evaluation of right flank pain and syncopal episodes.  Evaluation including urine, blood work, EKG revealing.  No identifiable cause found.  Has not had any recurrent syncopal symptoms however has continued to have progressively worsening right flank pain.  Symptoms initially began a little over 1 week ago.  Started as more of an ache but is now constant with overall progression but sometimes waxing waning intensity.  No radiation of symptoms.  No associated abdominal pain, nausea, vomiting, diarrhea, constipation, vaginal symptoms.  No associated fever/chills, urinary frequency or urgency, dysuria, hematuria.  Does not recall any recent injuries to the area. No overlying skin changes.       PAST MEDICAL HISTORY:   Past Medical History:   Diagnosis Date    Personal history of other mental and behavioral disorders     No Comments Provided    Personal history of urinary infection     No Comments Provided       PAST SURGICAL HISTORY:   Past Surgical History:   Procedure Laterality Date    APPENDECTOMY OPEN      09/05,Open, [facility]    ESOPHAGOSCOPY, GASTROSCOPY, DUODENOSCOPY (EGD), COMBINED      02/23/2012,by Dr. Jama. bile reflux and gastropathy       FAMILY HISTORY:   Family History   Problem Relation Age of Onset    Diabetes Mother         Diabetes    Other - See Comments Mother         Psychiatric illness,depression    Myocardial Infarction Father        SOCIAL HISTORY:   Social History     Tobacco Use    Smoking status: Every Day     Current packs/day: 1.00     Average packs/day: 1 pack/day for 23.4 years (23.4 ttl pk-yrs)     Types: Cigarettes     Start date: 2002     Passive exposure: Past    Smokeless tobacco: Never    Tobacco comments:     Passive exposure in childhood home.    Substance Use Topics    Alcohol use: Yes      Comment: Alcoholic Drinks/day: Rarely / maybe a couple a month        Allergies   Allergen Reactions    Amoxicillin Rash     Current Outpatient Medications   Medication Sig Dispense Refill    albuterol (PROAIR HFA/PROVENTIL HFA/VENTOLIN HFA) 108 (90 Base) MCG/ACT inhaler Inhale 2 puffs into the lungs every 6 hours as needed for shortness of breath, wheezing or cough 18 g 0    propranolol (INDERAL) 10 MG tablet TAKE 1 TO 3 TABLETS BY MOUTH UP TO 2 TIMES DAILY AS NEEDED FOR ANXIETY (4 TO 6 HOURS BETWEEN DOSES). MONITOR FOR SIGNS/SYMPTOMS OF LOW BLOOD PRESSURE.      traZODone (DESYREL) 100 MG tablet Take 100 mg by mouth at bedtime      buPROPion (WELLBUTRIN XL) 300 MG 24 hr tablet Take 300 mg by mouth every morning      clindamycin (CLEOCIN) 300 MG capsule Take 1 capsule (300 mg) by mouth 3 times daily. (Patient not taking: Reported on 6/2/2025) 21 capsule 0    ibuprofen (ADVIL/MOTRIN) 200 MG tablet Take 600 mg by mouth every 4 hours as needed for pain       No current facility-administered medications for this visit.           Objective    /68 (BP Location: Right arm, Patient Position: Sitting, Cuff Size: Adult Regular)   Resp 16   LMP 05/21/2025   There is no height or weight on file to calculate BMI.  Physical Exam   General: Pleasant, in no apparent distress.  Eyes: Sclera are white and conjunctiva are clear bilaterally. Lacrimal apparatus free of erythema, edema, and discharge bilaterally.  Ears: External ears without erythema or edema.   Nose: External nose is symmetrical and free of lesions and deformities.   Cardiovascular: Regular rate and rhythm with S1 equal to S2. No murmurs, friction rubs, or gallops.   Respiratory: Lungs are resonant and clear to auscultation bilaterally. No wheezes, crackles, or rhonchi.  Abdomen: Abdomen is non-distended. Active bowel sounds heard in all four quadrants. No tenderness to palpation in all four quadrants. No rebound tenderness or guarding. No palpable masses noted.  No hepatosplenomegaly. CVA tenderness on right.   Musculoskeletal: Back is straight, no tenderness to palpation of paraspinal and paravertebral muscles. Full ROM of back, neck, upper and lower extremities.  Skin: No jaundice, pallor, rashes, or lesions.  Psych: Appropriate mood and affect.      Signed Electronically by: Lakia Erazo PA-C

## 2025-06-09 ENCOUNTER — HOSPITAL ENCOUNTER (OUTPATIENT)
Dept: CT IMAGING | Facility: OTHER | Age: 35
Discharge: HOME | End: 2025-06-09
Attending: PHYSICIAN ASSISTANT | Admitting: RADIOLOGY
Payer: COMMERCIAL

## 2025-06-09 ENCOUNTER — RESULTS FOLLOW-UP (OUTPATIENT)
Dept: FAMILY MEDICINE | Facility: OTHER | Age: 35
End: 2025-06-09

## 2025-06-09 DIAGNOSIS — R10.9 RIGHT FLANK PAIN: ICD-10-CM

## 2025-06-09 PROCEDURE — 74176 CT ABD & PELVIS W/O CONTRAST: CPT | Mod: 26 | Performed by: RADIOLOGY

## 2025-06-09 PROCEDURE — 74176 CT ABD & PELVIS W/O CONTRAST: CPT

## 2025-06-10 ENCOUNTER — OFFICE VISIT (OUTPATIENT)
Dept: FAMILY MEDICINE | Facility: OTHER | Age: 35
End: 2025-06-10
Attending: PHYSICIAN ASSISTANT
Payer: COMMERCIAL

## 2025-06-10 ENCOUNTER — RESULTS FOLLOW-UP (OUTPATIENT)
Dept: FAMILY MEDICINE | Facility: OTHER | Age: 35
End: 2025-06-10

## 2025-06-10 VITALS
TEMPERATURE: 97.7 F | BODY MASS INDEX: 27.5 KG/M2 | RESPIRATION RATE: 18 BRPM | SYSTOLIC BLOOD PRESSURE: 112 MMHG | DIASTOLIC BLOOD PRESSURE: 72 MMHG | OXYGEN SATURATION: 99 % | HEART RATE: 94 BPM | WEIGHT: 167.8 LBS

## 2025-06-10 DIAGNOSIS — R42 LIGHTHEADEDNESS: Primary | ICD-10-CM

## 2025-06-10 LAB
ALBUMIN SERPL BCG-MCNC: 4.6 G/DL (ref 3.5–5.2)
ALP SERPL-CCNC: 116 U/L (ref 40–150)
ALT SERPL W P-5'-P-CCNC: 16 U/L (ref 0–50)
ANION GAP SERPL CALCULATED.3IONS-SCNC: 12 MMOL/L (ref 7–15)
AST SERPL W P-5'-P-CCNC: 22 U/L (ref 0–45)
BASOPHILS # BLD AUTO: 0.1 10E3/UL (ref 0–0.2)
BASOPHILS NFR BLD AUTO: 1 %
BILIRUB SERPL-MCNC: 0.6 MG/DL
BUN SERPL-MCNC: 13.1 MG/DL (ref 6–20)
CALCIUM SERPL-MCNC: 9.5 MG/DL (ref 8.8–10.4)
CHLORIDE SERPL-SCNC: 103 MMOL/L (ref 98–107)
CREAT SERPL-MCNC: 0.91 MG/DL (ref 0.51–0.95)
EGFRCR SERPLBLD CKD-EPI 2021: 83 ML/MIN/1.73M2
EOSINOPHIL # BLD AUTO: 0.1 10E3/UL (ref 0–0.7)
EOSINOPHIL NFR BLD AUTO: 1 %
ERYTHROCYTE [DISTWIDTH] IN BLOOD BY AUTOMATED COUNT: 11.9 % (ref 10–15)
FERRITIN SERPL-MCNC: 52 NG/ML (ref 6–175)
GLUCOSE SERPL-MCNC: 93 MG/DL (ref 70–99)
HCO3 SERPL-SCNC: 22 MMOL/L (ref 22–29)
HCT VFR BLD AUTO: 44.4 % (ref 35–47)
HGB BLD-MCNC: 15.3 G/DL (ref 11.7–15.7)
IMM GRANULOCYTES # BLD: 0 10E3/UL
IMM GRANULOCYTES NFR BLD: 0 %
IRON BINDING CAPACITY (ROCHE): 337 UG/DL (ref 240–430)
IRON SATN MFR SERPL: 33 % (ref 15–46)
IRON SERPL-MCNC: 111 UG/DL (ref 37–145)
LYMPHOCYTES # BLD AUTO: 2.2 10E3/UL (ref 0.8–5.3)
LYMPHOCYTES NFR BLD AUTO: 22 %
MAGNESIUM SERPL-MCNC: 1.8 MG/DL (ref 1.7–2.3)
MCH RBC QN AUTO: 32.1 PG (ref 26.5–33)
MCHC RBC AUTO-ENTMCNC: 34.5 G/DL (ref 31.5–36.5)
MCV RBC AUTO: 93 FL (ref 78–100)
MONOCYTES # BLD AUTO: 0.6 10E3/UL (ref 0–1.3)
MONOCYTES NFR BLD AUTO: 6 %
NEUTROPHILS # BLD AUTO: 7 10E3/UL (ref 1.6–8.3)
NEUTROPHILS NFR BLD AUTO: 70 %
NRBC # BLD AUTO: 0 10E3/UL
NRBC BLD AUTO-RTO: 0 /100
PLATELET # BLD AUTO: 310 10E3/UL (ref 150–450)
POTASSIUM SERPL-SCNC: 4.3 MMOL/L (ref 3.4–5.3)
PROT SERPL-MCNC: 7.6 G/DL (ref 6.4–8.3)
RBC # BLD AUTO: 4.77 10E6/UL (ref 3.8–5.2)
SODIUM SERPL-SCNC: 137 MMOL/L (ref 135–145)
T3FREE SERPL-MCNC: 2.9 PG/ML (ref 2–4.4)
T4 FREE SERPL-MCNC: 1.41 NG/DL (ref 0.9–1.7)
TSH SERPL DL<=0.005 MIU/L-ACNC: 1.63 UIU/ML (ref 0.3–4.2)
VIT B12 SERPL-MCNC: 805 PG/ML (ref 232–1245)
VIT D+METAB SERPL-MCNC: 42 NG/ML (ref 20–50)
WBC # BLD AUTO: 10 10E3/UL (ref 4–11)

## 2025-06-10 PROCEDURE — 83540 ASSAY OF IRON: CPT | Mod: ZL | Performed by: PHYSICIAN ASSISTANT

## 2025-06-10 PROCEDURE — 82435 ASSAY OF BLOOD CHLORIDE: CPT | Mod: ZL | Performed by: PHYSICIAN ASSISTANT

## 2025-06-10 PROCEDURE — 84439 ASSAY OF FREE THYROXINE: CPT | Mod: ZL | Performed by: PHYSICIAN ASSISTANT

## 2025-06-10 PROCEDURE — 83735 ASSAY OF MAGNESIUM: CPT | Mod: ZL | Performed by: PHYSICIAN ASSISTANT

## 2025-06-10 PROCEDURE — 36415 COLL VENOUS BLD VENIPUNCTURE: CPT | Mod: ZL | Performed by: PHYSICIAN ASSISTANT

## 2025-06-10 PROCEDURE — 84481 FREE ASSAY (FT-3): CPT | Mod: ZL | Performed by: PHYSICIAN ASSISTANT

## 2025-06-10 PROCEDURE — 82306 VITAMIN D 25 HYDROXY: CPT | Mod: ZL | Performed by: PHYSICIAN ASSISTANT

## 2025-06-10 PROCEDURE — 85025 COMPLETE CBC W/AUTO DIFF WBC: CPT | Mod: ZL | Performed by: PHYSICIAN ASSISTANT

## 2025-06-10 PROCEDURE — 82728 ASSAY OF FERRITIN: CPT | Mod: ZL | Performed by: PHYSICIAN ASSISTANT

## 2025-06-10 PROCEDURE — 82607 VITAMIN B-12: CPT | Mod: ZL | Performed by: PHYSICIAN ASSISTANT

## 2025-06-10 PROCEDURE — 84443 ASSAY THYROID STIM HORMONE: CPT | Mod: ZL | Performed by: PHYSICIAN ASSISTANT

## 2025-06-10 ASSESSMENT — PAIN SCALES - GENERAL: PAINLEVEL_OUTOF10: MILD PAIN (2)

## 2025-06-10 NOTE — NURSING NOTE
"Chief Complaint   Patient presents with    Flank Pain     Right flank pain       Initial /72   Pulse 94   Temp 97.7  F (36.5  C) (Tympanic)   Resp 18   Wt 76.1 kg (167 lb 12.8 oz)   LMP 05/21/2025   SpO2 99%   BMI 27.50 kg/m   Estimated body mass index is 27.5 kg/m  as calculated from the following:    Height as of 5/30/25: 1.664 m (5' 5.5\").    Weight as of this encounter: 76.1 kg (167 lb 12.8 oz).  Medication Review: complete    The next two questions are to help us understand your food security.  If you are feeling you need any assistance in this area, we have resources available to support you today.          12/17/2024   SDOH- Food Insecurity   Within the past 12 months, did you worry that your food would run out before you got money to buy more? N   Within the past 12 months, did the food you bought just not last and you didn t have money to get more? N        Data saved with a previous flowsheet row definition         Health Care Directive:  Patient does not have a Health Care Directive: Discussed advance care planning with patient; however, patient declined at this time.    Rere Victor LPN      "

## 2025-06-10 NOTE — PROGRESS NOTES
Assessment & Plan     Lightheadedness  Persistent lightheadedness mainly with standing or changing positions.  Differential includes anemia, vitamin, iron, electrolyte abnormality, thyroid disorder, renal dysfunction, orthostatic hypotension, dehydration, hypo or hyper glycemia, hypo or hypertension, underlying cardiac cause, underlying neurologic cause, medication side effect, etc.  Vitals and exam stable.  EKG showing normal sinus rhythm without other abnormalities.  Labs pending as below.  Will notify with results and discussed consideration of additional testing including possible Zio patch, echocardiogram, further imaging.  - CBC and Differential; Future  - Comprehensive Metabolic Panel; Future  - Ferritin; Future  - Vitamin B12; Future  - Vitamin D Total; Future  - Iron & Iron Binding Capacity; Future  - TSH; Future  - T3, Free; Future  - T4, Free; Future  - Magnesium; Future  - EKG 12-lead, tracing only (Same Day)  - CBC and Differential  - Comprehensive Metabolic Panel  - Ferritin  - Vitamin B12  - Vitamin D Total  - Iron & Iron Binding Capacity  - TSH  - T3, Free  - T4, Free  - Magnesium      Isidro Feliz is a 35 year old, presenting for the following health issues:  Flank Pain (Right flank pain)    HPI    Here for follow-up on recent symptoms of lightheadedness as well as right flank pain.  Evaluated in ED for the symptoms on 5/30 as well as follow-up with/2.  See previous notes for full details.  Evaluation including labs, urine, CT abdomen pelvis has been largely revealing.  Since last appointment patient has been dealing with more persistent lightheadedness mainly noticed with standing for longer periods of time.  Feels like she is going to pass out.  Sometimes noticing a faster heart rate with changing positions but otherwise no associated palpitations, chest pain, pressure, headaches and vision changes, shortness of breath.  Tries to stay works for [employer] standing and climbing on machines for  long periods of time so has not been able to attend work due to the symptoms.  She is needing paperwork filled out for HR.      PAST MEDICAL HISTORY:   Past Medical History:   Diagnosis Date    Personal history of other mental and behavioral disorders     No Comments Provided    Personal history of urinary infection     No Comments Provided       PAST SURGICAL HISTORY:   Past Surgical History:   Procedure Laterality Date    APPENDECTOMY OPEN      09/05,Open, [facility]    ESOPHAGOSCOPY, GASTROSCOPY, DUODENOSCOPY (EGD), COMBINED      02/23/2012,by Dr. Jama. bile reflux and gastropathy       FAMILY HISTORY:   Family History   Problem Relation Age of Onset    Diabetes Mother         Diabetes    Other - See Comments Mother         Psychiatric illness,depression    Myocardial Infarction Father        SOCIAL HISTORY:   Social History     Tobacco Use    Smoking status: Every Day     Current packs/day: 1.00     Average packs/day: 1 pack/day for 23.4 years (23.4 ttl pk-yrs)     Types: Cigarettes     Start date: 2002     Passive exposure: Past    Smokeless tobacco: Never    Tobacco comments:     Passive exposure in childhood home.    Substance Use Topics    Alcohol use: Yes     Comment: Alcoholic Drinks/day: Rarely / maybe a couple a month        Allergies   Allergen Reactions    Amoxicillin Rash     Current Outpatient Medications   Medication Sig Dispense Refill    ibuprofen (ADVIL/MOTRIN) 200 MG tablet Take 600 mg by mouth every 4 hours as needed for pain      traZODone (DESYREL) 100 MG tablet Take 100 mg by mouth at bedtime      albuterol (PROAIR HFA/PROVENTIL HFA/VENTOLIN HFA) 108 (90 Base) MCG/ACT inhaler Inhale 2 puffs into the lungs every 6 hours as needed for shortness of breath, wheezing or cough (Patient not taking: Reported on 6/10/2025) 18 g 0     No current facility-administered medications for this visit.           Objective    /72   Pulse 94   Temp 97.7  F (36.5  C) (Tympanic)   Resp 18   Wt 76.1 kg (167  lb 12.8 oz)   LMP 05/21/2025   SpO2 99%   BMI 27.50 kg/m    Body mass index is 27.5 kg/m .  Physical Exam   General: Pleasant, in no apparent distress.  Eyes: Sclera are white and conjunctiva are clear bilaterally. Lacrimal apparatus free of erythema, edema, and discharge bilaterally.  Ears: External ears without erythema or edema. Tympanic membranes are pearly white and without erythema, scarring or perforations bilaterally. External auditory canals are free of foreign bodies, erythema, ulcers, and masses.  Nose: External nose is symmetrical and free of lesions and deformities.  Oropharynx: Oral mucosa is pink and without ulcers, nodules, and white patches. Tongue is symmetrical, pink, and without masses or lesions. Pharynx is pink, symmetrical, and without lesions. Uvula is midline. Tonsils are pink, symmetrical, and without edema, ulcers, or exudates, and 1+ bilaterally.  Cardiovascular: Regular rate and rhythm with S1 equal to S2. No murmurs, friction rubs, or gallops.   Respiratory: Lungs are resonant and clear to auscultation bilaterally. No wheezes, crackles, or rhonchi.  Skin: No jaundice, pallor, rashes, or lesions.  Psych: Appropriate mood and affect.    Results for orders placed or performed in visit on 06/10/25   Comprehensive Metabolic Panel     Status: Normal   Result Value Ref Range    Sodium 137 135 - 145 mmol/L    Potassium 4.3 3.4 - 5.3 mmol/L    Carbon Dioxide (CO2) 22 22 - 29 mmol/L    Anion Gap 12 7 - 15 mmol/L    Urea Nitrogen 13.1 6.0 - 20.0 mg/dL    Creatinine 0.91 0.51 - 0.95 mg/dL    GFR Estimate 83 >60 mL/min/1.73m2    Calcium 9.5 8.8 - 10.4 mg/dL    Chloride 103 98 - 107 mmol/L    Glucose 93 70 - 99 mg/dL    Alkaline Phosphatase 116 40 - 150 U/L    AST 22 0 - 45 U/L    ALT 16 0 - 50 U/L    Protein Total 7.6 6.4 - 8.3 g/dL    Albumin 4.6 3.5 - 5.2 g/dL    Bilirubin Total 0.6 <=1.2 mg/dL   Ferritin     Status: Normal   Result Value Ref Range    Ferritin 52 6 - 175 ng/mL   Vitamin B12      Status: Normal   Result Value Ref Range    Vitamin B12 805 232 - 1,245 pg/mL   Iron & Iron Binding Capacity     Status: Normal   Result Value Ref Range    Iron 111 37 - 145 ug/dL    Iron Binding Capacity 337 240 - 430 ug/dL    Iron Sat Index 33 15 - 46 %   TSH     Status: Normal   Result Value Ref Range    TSH 1.63 0.30 - 4.20 uIU/mL   T4, Free     Status: Normal   Result Value Ref Range    Free T4 1.41 0.90 - 1.70 ng/dL   Magnesium     Status: Normal   Result Value Ref Range    Magnesium 1.8 1.7 - 2.3 mg/dL   CBC with platelets and differential     Status: None   Result Value Ref Range    WBC Count 10.0 4.0 - 11.0 10e3/uL    RBC Count 4.77 3.80 - 5.20 10e6/uL    Hemoglobin 15.3 11.7 - 15.7 g/dL    Hematocrit 44.4 35.0 - 47.0 %    MCV 93 78 - 100 fL    MCH 32.1 26.5 - 33.0 pg    MCHC 34.5 31.5 - 36.5 g/dL    RDW 11.9 10.0 - 15.0 %    Platelet Count 310 150 - 450 10e3/uL    % Neutrophils 70 %    % Lymphocytes 22 %    % Monocytes 6 %    % Eosinophils 1 %    % Basophils 1 %    % Immature Granulocytes 0 %    NRBCs per 100 WBC 0 <1 /100    Absolute Neutrophils 7.0 1.6 - 8.3 10e3/uL    Absolute Lymphocytes 2.2 0.8 - 5.3 10e3/uL    Absolute Monocytes 0.6 0.0 - 1.3 10e3/uL    Absolute Eosinophils 0.1 0.0 - 0.7 10e3/uL    Absolute Basophils 0.1 0.0 - 0.2 10e3/uL    Absolute Immature Granulocytes 0.0 <=0.4 10e3/uL    Absolute NRBCs 0.0 10e3/uL   EKG 12-lead, tracing only (Same Day)     Status: None (Preliminary result)   Result Value Ref Range    Systolic Blood Pressure  mmHg    Diastolic Blood Pressure  mmHg    Ventricular Rate 78 BPM    Atrial Rate 78 BPM    HI Interval 144 ms    QRS Duration 82 ms     ms    QTc 433 ms    P Axis 72 degrees    R AXIS 38 degrees    T Axis 39 degrees    Interpretation ECG       Sinus rhythm  Normal ECG  When compared with ECG of 30-May-2025 19:46,  No significant change was found     CBC and Differential     Status: None    Narrative    The following orders were created for panel order  CBC and Differential.  Procedure                               Abnormality         Status                     ---------                               -----------         ------                     CBC with platelets and ...[[id number]]                      Final result                 Please view results for these tests on the individual orders.       Signed Electronically by: Lakia Erazo PA-C

## 2025-06-11 LAB
ATRIAL RATE - MUSE: 78 BPM
DIASTOLIC BLOOD PRESSURE - MUSE: NORMAL MMHG
INTERPRETATION ECG - MUSE: NORMAL
P AXIS - MUSE: 72 DEGREES
PR INTERVAL - MUSE: 144 MS
QRS DURATION - MUSE: 82 MS
QT - MUSE: 380 MS
QTC - MUSE: 433 MS
R AXIS - MUSE: 38 DEGREES
SYSTOLIC BLOOD PRESSURE - MUSE: NORMAL MMHG
T AXIS - MUSE: 39 DEGREES
VENTRICULAR RATE- MUSE: 78 BPM

## 2025-06-16 NOTE — TELEPHONE ENCOUNTER
Would like to figure out what's going on. Feeling a little better but would like to do the heart monitor.     Reece'd up order. (Not sure how long you want to keep it on)    Routing to provider to review and respond.  Tita Gomez, RN on 6/16/2025 at 11:52 AM

## 2025-06-23 NOTE — PROGRESS NOTES
Assessment & Plan     Slow transit constipation  Scribes symptoms consistent with slow transit constipation.  Discussed options including over-the-counter MiraLAX increasing dietary fiber intake, fiber supplements, pre-/probiotics, stool softeners, laxatives, suppositories, enemas, etc.  Patient in agreement with MiraLAX and starting fiber/pre-/probiotic supplements.  Will increase dietary fiber as able.  Encourage good hydration.  If minimal improvement follow-up for additional evaluation.        Isidro Feliz is a 35 year old, presenting for the following health issues:  Constipation and Flank Pain    History of Present Illness       Reason for visit:  Lightheadedness, bowel issues, stomach pain    She eats 0-1 servings of fruits and vegetables daily.She consumes 0 sweetened beverage(s) daily.She exercises with enough effort to increase her heart rate 10 to 19 minutes per day.  She exercises with enough effort to increase her heart rate 5 days per week.   She is taking medications regularly.      Here for discussion regarding chronic bowel issues.  Patient reports over the last year or so she has been dealing with some constipation symptoms.  Typically having a bowel movement 1 day/week.  Sometimes will be a couple bowel movements that day.  Reasonably soft.  Not straining significantly to go.  Does not feel like she is fully emptying most of the time.  Some abdominal discomfort after.  Has tried over-the-counter stool softeners without improvement.  Does not feel she is getting enough fiber but is feeling like she is well-hydrated.  No significant abdominal cramping, blood in stool, urinary symptoms.      PAST MEDICAL HISTORY:   Past Medical History:   Diagnosis Date    Personal history of other mental and behavioral disorders     No Comments Provided    Personal history of urinary infection     No Comments Provided       PAST SURGICAL HISTORY:   Past Surgical History:   Procedure Laterality Date     APPENDECTOMY OPEN      09/05,Open, [facility]    ESOPHAGOSCOPY, GASTROSCOPY, DUODENOSCOPY (EGD), COMBINED      02/23/2012,by Dr. Jama. bile reflux and gastropathy       FAMILY HISTORY:   Family History   Problem Relation Age of Onset    Diabetes Mother         Diabetes    Other - See Comments Mother         Psychiatric illness,depression    Myocardial Infarction Father        SOCIAL HISTORY:   Social History     Tobacco Use    Smoking status: Every Day     Current packs/day: 1.00     Average packs/day: 1 pack/day for 23.5 years (23.5 ttl pk-yrs)     Types: Cigarettes     Start date: 2002     Passive exposure: Past    Smokeless tobacco: Never    Tobacco comments:     Passive exposure in childhood home.    Substance Use Topics    Alcohol use: Yes     Comment: Alcoholic Drinks/day: Rarely / maybe a couple a month        Allergies   Allergen Reactions    Amoxicillin Rash     Current Outpatient Medications   Medication Sig Dispense Refill    ibuprofen (ADVIL/MOTRIN) 200 MG tablet Take 600 mg by mouth every 4 hours as needed for pain      propranolol (INDERAL) 10 MG tablet       traZODone (DESYREL) 100 MG tablet Take 100 mg by mouth at bedtime      albuterol (PROAIR HFA/PROVENTIL HFA/VENTOLIN HFA) 108 (90 Base) MCG/ACT inhaler Inhale 2 puffs into the lungs every 6 hours as needed for shortness of breath, wheezing or cough (Patient not taking: Reported on 6/25/2025) 18 g 0     No current facility-administered medications for this visit.           Objective    /72   Pulse 87   Temp 97.6  F (36.4  C) (Tympanic)   Resp 16   Wt 76.3 kg (168 lb 3.2 oz)   LMP 05/21/2025   SpO2 98%   BMI 27.56 kg/m    Body mass index is 27.56 kg/m .  Physical Exam   General: Pleasant, in no apparent distress.  Skin: No jaundice, pallor, rashes, or lesions.  Psych: Appropriate mood and affect.      Signed Electronically by: Lakia Erazo PA-C

## 2025-06-25 ENCOUNTER — OFFICE VISIT (OUTPATIENT)
Dept: FAMILY MEDICINE | Facility: OTHER | Age: 35
End: 2025-06-25
Attending: PHYSICIAN ASSISTANT
Payer: COMMERCIAL

## 2025-06-25 VITALS
RESPIRATION RATE: 16 BRPM | DIASTOLIC BLOOD PRESSURE: 72 MMHG | HEART RATE: 87 BPM | TEMPERATURE: 97.6 F | BODY MASS INDEX: 27.56 KG/M2 | OXYGEN SATURATION: 98 % | SYSTOLIC BLOOD PRESSURE: 118 MMHG | WEIGHT: 168.2 LBS

## 2025-06-25 DIAGNOSIS — K59.01 SLOW TRANSIT CONSTIPATION: Primary | ICD-10-CM

## 2025-06-25 RX ORDER — PROPRANOLOL HYDROCHLORIDE 10 MG/1
TABLET ORAL
COMMUNITY
Start: 2024-06-20

## 2025-06-25 ASSESSMENT — PAIN SCALES - GENERAL: PAINLEVEL_OUTOF10: NO PAIN (0)

## 2025-06-25 NOTE — NURSING NOTE
"Chief Complaint   Patient presents with    Constipation    Flank Pain       Initial /72   Pulse 87   Temp 97.6  F (36.4  C) (Tympanic)   Resp 16   Wt 76.3 kg (168 lb 3.2 oz)   LMP 05/21/2025   SpO2 98%   BMI 27.56 kg/m   Estimated body mass index is 27.56 kg/m  as calculated from the following:    Height as of 5/30/25: 1.664 m (5' 5.5\").    Weight as of this encounter: 76.3 kg (168 lb 3.2 oz).  Medication Review: complete    The next two questions are to help us understand your food security.  If you are feeling you need any assistance in this area, we have resources available to support you today.          12/17/2024   SDOH- Food Insecurity   Within the past 12 months, did you worry that your food would run out before you got money to buy more? N   Within the past 12 months, did the food you bought just not last and you didn t have money to get more? N        Data saved with a previous flowsheet row definition         Health Care Directive:  Patient does not have a Health Care Directive: Discussed advance care planning with patient; however, patient declined at this time.    Rere Victor LPN      "

## 2025-06-30 ENCOUNTER — MYC MEDICAL ADVICE (OUTPATIENT)
Dept: FAMILY MEDICINE | Facility: OTHER | Age: 35
End: 2025-06-30
Payer: COMMERCIAL

## 2025-07-03 NOTE — PROGRESS NOTES
Assessment & Plan     Lightheadedness  Persistent symptoms affecting her near daily with previously unremarkable evaluation including exam and labs. Zio patch results are still pending. Consider echo. Updated paperwork for HR outlining patient to remain out of work for now given it would not be safe for her return to climb or operate machinery.     Isidro Feliz is a 35 year old, presenting for the following health issues:  Forms    History of Present Illness       Reason for visit:  Paperwork   She is taking medications regularly.      Here for follow-up on lightheadedness.  Patient has been seen intermittently for the symptoms since onset end of May where she was originally seen in the ED.  Evaluation at that time was unremarkable.  Has had 3 subsequent clinic visits with updated exam and labs that are unrevealing.  Symptoms remain persistent.  Patient works at [employer] and is often having to climb and operate machinery so we have had her out of work until symptoms are treated or improved.  HR is requesting updated paperwork.  Patient recently had Zio patch sent in, results are still pending.  Overall symptoms do seem to be improving but are still persistent.  No associated chest pain or pressure, recurrent syncope, headaches, vision changes, shortness of breath.    PAST MEDICAL HISTORY:   Past Medical History:   Diagnosis Date    Personal history of other mental and behavioral disorders     No Comments Provided    Personal history of urinary infection     No Comments Provided       PAST SURGICAL HISTORY:   Past Surgical History:   Procedure Laterality Date    APPENDECTOMY OPEN      09/05,Open, [facility]    ESOPHAGOSCOPY, GASTROSCOPY, DUODENOSCOPY (EGD), COMBINED      02/23/2012,by Dr. Jama. bile reflux and gastropathy       FAMILY HISTORY:   Family History   Problem Relation Age of Onset    Diabetes Mother         Diabetes    Other - See Comments Mother         Psychiatric illness,depression    Myocardial  "Infarction Father        SOCIAL HISTORY:   Social History     Tobacco Use    Smoking status: Every Day     Current packs/day: 1.00     Average packs/day: 1 pack/day for 23.5 years (23.5 ttl pk-yrs)     Types: Cigarettes     Start date: 2002     Passive exposure: Past    Smokeless tobacco: Never    Tobacco comments:     Passive exposure in childhood home.    Substance Use Topics    Alcohol use: Yes     Comment: Alcoholic Drinks/day: Rarely / maybe a couple a month        Allergies   Allergen Reactions    Amoxicillin Rash     Current Outpatient Medications   Medication Sig Dispense Refill    ibuprofen (ADVIL/MOTRIN) 200 MG tablet Take 600 mg by mouth every 4 hours as needed for pain      propranolol (INDERAL) 10 MG tablet       traZODone (DESYREL) 100 MG tablet Take 100 mg by mouth at bedtime      albuterol (PROAIR HFA/PROVENTIL HFA/VENTOLIN HFA) 108 (90 Base) MCG/ACT inhaler Inhale 2 puffs into the lungs every 6 hours as needed for shortness of breath, wheezing or cough (Patient not taking: Reported on 7/7/2025) 18 g 0     No current facility-administered medications for this visit.           Objective    /72   Pulse 87   Temp 98.2  F (36.8  C) (Tympanic)   Resp 14   Ht 1.664 m (5' 5.5\")   Wt 77.6 kg (171 lb)   LMP 06/16/2025 (Approximate)   SpO2 98%   BMI 28.02 kg/m    Body mass index is 28.02 kg/m .  Physical Exam   General: Pleasant, in no apparent distress.  Eyes: Sclera are white and conjunctiva are clear bilaterally. Lacrimal apparatus free of erythema, edema, and discharge bilaterally.  Ears: External ears without erythema or edema.   Nose: External nose is symmetrical and free of lesions and deformities.   Skin: No jaundice, pallor, rashes, or lesions.  Psych: Appropriate mood and affect.        Signed Electronically by: Lakia Erazo PA-C    "

## 2025-07-07 ENCOUNTER — OFFICE VISIT (OUTPATIENT)
Dept: FAMILY MEDICINE | Facility: OTHER | Age: 35
End: 2025-07-07
Attending: PHYSICIAN ASSISTANT
Payer: COMMERCIAL

## 2025-07-07 VITALS
TEMPERATURE: 98.2 F | OXYGEN SATURATION: 98 % | DIASTOLIC BLOOD PRESSURE: 72 MMHG | RESPIRATION RATE: 14 BRPM | HEIGHT: 66 IN | SYSTOLIC BLOOD PRESSURE: 114 MMHG | WEIGHT: 171 LBS | HEART RATE: 87 BPM | BODY MASS INDEX: 27.48 KG/M2

## 2025-07-07 DIAGNOSIS — R42 LIGHTHEADEDNESS: Primary | ICD-10-CM

## 2025-07-07 ASSESSMENT — PAIN SCALES - GENERAL: PAINLEVEL_OUTOF10: MILD PAIN (1)

## 2025-07-07 NOTE — NURSING NOTE
"Chief Complaint   Patient presents with    Forms       Initial /72   Pulse 87   Temp 98.2  F (36.8  C) (Tympanic)   Resp 14   Ht 1.664 m (5' 5.5\")   Wt 77.6 kg (171 lb)   LMP 06/16/2025 (Approximate)   SpO2 98%   BMI 28.02 kg/m   Estimated body mass index is 28.02 kg/m  as calculated from the following:    Height as of this encounter: 1.664 m (5' 5.5\").    Weight as of this encounter: 77.6 kg (171 lb).  Medication Review: complete    The next two questions are to help us understand your food security.  If you are feeling you need any assistance in this area, we have resources available to support you today.          12/17/2024   SDOH- Food Insecurity   Within the past 12 months, did you worry that your food would run out before you got money to buy more? N   Within the past 12 months, did the food you bought just not last and you didn t have money to get more? N        Data saved with a previous flowsheet row definition         Health Care Directive:  Patient does not have a Health Care Directive: Discussed advance care planning with patient; however, patient declined at this time.    Rere Victor LPN      "

## 2025-07-08 ENCOUNTER — RESULTS FOLLOW-UP (OUTPATIENT)
Dept: FAMILY MEDICINE | Facility: OTHER | Age: 35
End: 2025-07-08
Payer: COMMERCIAL

## 2025-07-08 ENCOUNTER — TELEPHONE (OUTPATIENT)
Dept: FAMILY MEDICINE | Facility: OTHER | Age: 35
End: 2025-07-08
Payer: COMMERCIAL

## 2025-07-08 DIAGNOSIS — I49.9 ABNORMAL HEART RHYTHM: ICD-10-CM

## 2025-07-08 DIAGNOSIS — R42 LIGHTHEADEDNESS: Primary | ICD-10-CM

## 2025-07-08 NOTE — TELEPHONE ENCOUNTER
Would like to go forward with Cardiology and Echo.     Reece'd up orders.     Routing to provider to review and respond.  Tita Gomez RN on 7/8/2025 at 9:06 AM

## 2025-07-11 ENCOUNTER — TELEPHONE (OUTPATIENT)
Dept: FAMILY MEDICINE | Facility: OTHER | Age: 35
End: 2025-07-11
Payer: COMMERCIAL

## 2025-07-11 NOTE — TELEPHONE ENCOUNTER
- [id number], [id number], [id number], , [phone]-  Denied by [employer]   Faxed denial to [facility].   This test is needed when there are other signs and symptoms of heart disease (such as difficulty  breathing, fainting or unusual sound heard during a heartbeat) or certain findings on your heart  tracing (ECG). We reviewed the notes we have. The notes do not show that you have other  signs or symptoms of heart disease. The notes do not show that you have such abnormal  findings on your heart tracing. Based on the information we have, this test is not medically  necessary. We used [facility] Medical Benefits Management Clinical Guideline titled Imaging of  the Heart, Resting Transthoracic Echocardiography to make this decision. You may view this  guideline at [email or web address]-[email or web address].    **If your physician would like to discuss this denial with a [facility] physician, they may do so by  calling [facility] at [phone] within 10 business days of the date on this letter. A peer-topeer discussion is not required before you request an appeal of this decision.

## 2025-07-11 NOTE — TELEPHONE ENCOUNTER
Echo was denied due to not being medically necessary.     Rere Victor LPN on 7/11/2025 at 3:10 PM Ext [phone]

## 2025-07-11 NOTE — TELEPHONE ENCOUNTER
Please notify patient echo was denied by her insurance.   Lakia Erazo PA-C on 7/11/2025 at 5:13 PM

## 2025-07-14 NOTE — TELEPHONE ENCOUNTER
Patient was given information. No further questions or concerns.     Rere Victor LPN on 7/14/2025 at 8:19 AM Ext [phone]

## 2025-08-07 ENCOUNTER — MYC MEDICAL ADVICE (OUTPATIENT)
Dept: FAMILY MEDICINE | Facility: OTHER | Age: 35
End: 2025-08-07
Payer: COMMERCIAL

## (undated) RX ORDER — LIDOCAINE HYDROCHLORIDE 10 MG/ML
INJECTION, SOLUTION INFILTRATION; PERINEURAL
Status: DISPENSED
Start: 2024-12-26